# Patient Record
Sex: MALE | Race: WHITE | Employment: FULL TIME | URBAN - METROPOLITAN AREA
[De-identification: names, ages, dates, MRNs, and addresses within clinical notes are randomized per-mention and may not be internally consistent; named-entity substitution may affect disease eponyms.]

---

## 2018-09-24 ENCOUNTER — OFFICE VISIT (OUTPATIENT)
Dept: URGENT CARE | Age: 53
End: 2018-09-24

## 2018-09-24 VITALS
BODY MASS INDEX: 27.63 KG/M2 | RESPIRATION RATE: 18 BRPM | DIASTOLIC BLOOD PRESSURE: 86 MMHG | TEMPERATURE: 98.1 F | WEIGHT: 193 LBS | HEIGHT: 70 IN | OXYGEN SATURATION: 98 % | HEART RATE: 61 BPM | SYSTOLIC BLOOD PRESSURE: 143 MMHG

## 2018-09-24 DIAGNOSIS — S92.355A CLOSED NONDISPLACED FRACTURE OF FIFTH METATARSAL BONE OF LEFT FOOT, INITIAL ENCOUNTER: Primary | ICD-10-CM

## 2018-09-24 NOTE — PROGRESS NOTES
Patient is a 50 y.o. male presenting with foot injury. Foot Injury   This is a new problem. The current episode started 2 days ago (twisted at gym today ). The problem occurs constantly. Associated symptoms comments: Left foot pain and tenderness on lateral of foot . The symptoms are aggravated by walking and standing. Nothing relieves the symptoms. He has tried nothing for the symptoms. History reviewed. No pertinent past medical history. History reviewed. No pertinent surgical history. History reviewed. No pertinent family history. Social History     Social History    Marital status:      Spouse name: N/A    Number of children: N/A    Years of education: N/A     Occupational History    Not on file. Social History Main Topics    Smoking status: Former Smoker    Smokeless tobacco: Never Used    Alcohol use Not on file    Drug use: Not on file    Sexual activity: Not on file     Other Topics Concern    Not on file     Social History Narrative    No narrative on file                ALLERGIES: Penicillins    Review of Systems   Musculoskeletal: Positive for gait problem. All other systems reviewed and are negative. Vitals:    09/24/18 1717   BP: 143/86   Pulse: 61   Resp: 18   Temp: 98.1 °F (36.7 °C)   SpO2: 98%   Weight: 193 lb (87.5 kg)   Height: 5' 10\" (1.778 m)       Physical Exam   Musculoskeletal:        Left ankle: Normal.        Left foot: There is decreased range of motion, tenderness, bony tenderness and swelling (on head of 5th metatarsal bone). There is no crepitus and no deformity. Feet:    Nursing note and vitals reviewed. MDM    Procedures    ICD-10-CM ICD-9-CM    1. Injury T14.90XA 959.9 XR FOOT LT MIN 3 V       Follow with ortho     No orders of the defined types were placed in this encounter. Results for orders placed or performed in visit on 09/24/18   XR FOOT LT MIN 3 V    Narrative    EXAM:  XR FOOT LT MIN 3 V    INDICATION:   fall. Left foot pain    COMPARISON:  None. FINDINGS:  Three views of the left foot demonstrate nondisplaced fracture at the  base of the fifth metatarsal.  There is no apparent arthritis. There is  ossification of the Achilles tendon insertion. Impression    IMPRESSION:  Nondisplaced fracture base left fifth metatarsal.           The patients condition was discussed with the patient and they understand. The patient is to follow up with primary care doctor. If signs and symptoms become worse the pt is to go to the ER. The patient is to take medications as prescribed.

## 2018-09-24 NOTE — MR AVS SNAPSHOT
[address] [facility] [address] 
[phone] Patient: Hope Angelucci MRN: [id number] :1965 Visit Information Date & Time Provider Department Dept. Phone Encounter #  
 2018  4:45 PM [facility] [phone] [id number] Follow-up Instructions Return for Follow up with Orthopedic . Upcoming Health Maintenance Date Due DTaP/Tdap/Td series (1 - Tdap) 1988 Shingrix Vaccine Age 50> (1 of 2) 2017 FOBT Q 1 YEAR AGE 50-75 2017 Influenza Age 5 to Adult 2018 Allergies as of 2018  Review Complete On: 2018 By: Jessica Brenner Severity Noted Reaction Type Reactions Penicillins  2018    Rash Current Immunizations  Never Reviewed No immunizations on file. Not reviewed this visit You Were Diagnosed With   
  
 Codes Comments Closed nondisplaced fracture of fifth metatarsal bone of left foot, initial encounter    -  Primary ICD-10-CM: D16.226T ICD-9-CM: 825.25 Vitals BP Pulse Temp Resp Height(growth percentile) Weight(growth percentile) 143/86 61 98.1 °F (36.7 °C) 18 5' 10\" (1.778 m) 193 lb (87.5 kg) SpO2 BMI Smoking Status 98% 27.69 kg/m2 Former Smoker BMI and BSA Data Body Mass Index Body Surface Area  
 27.69 kg/m 2 2.08 m 2 Preferred Pharmacy Pharmacy Name Phone [address], [address] [place] [address]. [phone] Your Updated Medication List  
  
Notice  As of 2018  5:53 PM  
 You have not been prescribed any medications. Follow-up Instructions Return for Follow up with Orthopedic . To-Do List   
 2018 Imaging:  XR FOOT LT MIN 3 V Patient Instructions Fifth Metatarsal Monahan Fracture: Care Instructions Your Care Instructions A fifth metatarsal fracture is a break or a thin, hairline crack in the long bone on the outside of the foot. A Monahan fracture occurs near the end of this bone that is closest to the ankle. This type of fracture can happen when a person jumps or changes direction quickly and twists the foot or ankle the wrong way. Or it can happen from repeated stress on the bones of the foot. Treatment depends on how bad the fracture is. You may or may not have had surgery. Your doctor may have put your foot in a cast to keep it stable. A splint may be used in some cases if there is a lot of swelling. You may have been given crutches to use to keep weight off your foot. Your doctor may recommend that you keep weight off the foot for several weeks. The fracture may take 6 weeks to several months to heal. It is important to give your foot time to heal completely so that you don't hurt it again. Do not return to your usual activities until your doctor says you can. Your doctor may suggest that you get physical therapy to help regain strength and range of motion in your foot. You heal best when you take good care of yourself. Eat a variety of healthy foods, and don't smoke. Follow-up care is a key part of your treatment and safety. Be sure to make and go to all appointments, and call your doctor if you are having problems. It's also a good idea to know your test results and keep a list of the medicines you take. How can you care for yourself at home? · Be safe with medicines. Read and follow all instructions on the label. ¨ If the doctor gave you a prescription medicine for pain, take it as prescribed. ¨ If you are not taking a prescription pain medicine, ask your doctor if you can take an over-the-counter medicine. · Follow your doctor's instructions about how much weight you can put on your foot and when you can go back to your usual activities. If you were given crutches, use them as directed. · Put ice or a cold pack on your foot for 10 to 20 minutes at a time. Try to do this every 1 to 2 hours for the next 3 days (when you are awake) or until the swelling goes down. Put a thin cloth between the ice and your skin. · Prop up your foot on a pillow when you ice it or anytime you sit or lie down for the next 3 days. Try to keep it above the level of your heart. This will help reduce swelling. Cast and splint care · If your foot is in a cast or splint, follow the cast or splint care instructions your doctor gives you. If you have a removable fiberglass walking cast or a splint, do not take it off unless your doctor tells you to. · Keep your cast or splint dry. If you have a removable fiberglass walking cast or a splint, ask your doctor if it is okay to remove it to bathe. Your doctor may want you to keep it on as much as possible. · If you are told to keep your cast or splint on, tape a sheet of plastic to cover it when you bathe. Water under the cast or splint can cause your skin to itch and hurt. · Never cut your cast or stick anything down inside it to scratch an itch on your leg. When should you call for help? Call 911 anytime you think you may need emergency care. For example, call if: 
  · You have symptoms of a blood clot in your lung (called a pulmonaryembolism). These may include: 
¨ Sudden chest pain. ¨ Trouble breathing. ¨ Coughing up blood.  
  · You passed out (lost consciousness).  
 Call your doctor now or seek immediate medical care if: 
  · You have problems with your cast or splint. For example: ¨ The skin under the cast or splint is burning or stinging. ¨ The cast or splint feels too tight. ¨ There is a lot of swelling near the cast or splint. (Some swelling is normal.) ¨ You have a new fever. ¨ There is drainage or a bad smell coming from the cast or splint.  
  · You have increased or severe pain.  
  · You have tingling, weakness, or numbness in your foot and toes.   · You cannot move your toes.  
  · Your foot turns cold or changes color.  
 Watch closely for changes in your health, and be sure to contact your doctor if: 
  · The pain does not get better day by day.  
  · You do not get better as expected. Where can you learn more? Go to http://sima-refugio.info/. Enter B361 in the search box to learn more about \"Fifth Metatarsal Monahan Fracture: Care Instructions. \" Current as of: November 29, 2017 Content Version: 11.7 © 0983-9737 [employer]. Care instructions adapted under license by [employer] (which disclaims liability or warranty for this information). If you have questions about a medical condition or this instruction, always ask your healthcare professional. [address] any warranty or liability for your use of this information. Introducing [facility]! [facility] introduces MAPPING patient portal. Now you can access parts of your medical record, email your doctor's office, and request medication refills online. 1. In your internet browser, go to [email or web address] 2. Click on the First Time User? Click Here link in the Sign In box. You will see the New Member Sign Up page. 3. Enter your MAPPING Access Code exactly as it appears below. You will not need to use this code after youve completed the sign-up process. If you do not sign up before the expiration date, you must request a new code. · MAPPING Access Code: [email or web address] Expires: 12/23/2018  4:45 PM 
 
4. Enter the last four digits of your Social Security Number (xxxx) and Date of Birth (mm/dd/yyyy) as indicated and click Submit. You will be taken to the next sign-up page. 5. Create a [employer] ID. This will be your MAPPING login ID and cannot be changed, so think of one that is secure and easy to remember. 6. Create a [employer] password. You can change your password at any time. 7. Enter your Password Reset Question and Answer. This can be used at a later time if you forget your password. 8. Enter your e-mail address. You will receive e-mail notification when new information is available in [address] 9. Click Sign Up. You can now view and download portions of your medical record. 10. Click the Download Summary menu link to download a portable copy of your medical information. If you have questions, please visit the Frequently Asked Questions section of the [employer] website. Remember, [employer] is NOT to be used for urgent needs. For medical emergencies, dial 911. Now available from your iPhone and Android! Please provide this summary of care documentation to your next provider. If you have any questions after today's visit, please call [phone].

## 2018-09-24 NOTE — PATIENT INSTRUCTIONS
Fifth Metatarsal Monahan Fracture: Care Instructions  Your Care Instructions    A fifth metatarsal fracture is a break or a thin, hairline crack in the long bone on the outside of the foot. A Monahan fracture occurs near the end of this bone that is closest to the ankle. This type of fracture can happen when a person jumps or changes direction quickly and twists the foot or ankle the wrong way. Or it can happen from repeated stress on the bones of the foot. Treatment depends on how bad the fracture is. You may or may not have had surgery. Your doctor may have put your foot in a cast to keep it stable. A splint may be used in some cases if there is a lot of swelling. You may have been given crutches to use to keep weight off your foot. Your doctor may recommend that you keep weight off the foot for several weeks. The fracture may take 6 weeks to several months to heal. It is important to give your foot time to heal completely so that you don't hurt it again. Do not return to your usual activities until your doctor says you can. Your doctor may suggest that you get physical therapy to help regain strength and range of motion in your foot. You heal best when you take good care of yourself. Eat a variety of healthy foods, and don't smoke. Follow-up care is a key part of your treatment and safety. Be sure to make and go to all appointments, and call your doctor if you are having problems. It's also a good idea to know your test results and keep a list of the medicines you take. How can you care for yourself at home? · Be safe with medicines. Read and follow all instructions on the label. ¨ If the doctor gave you a prescription medicine for pain, take it as prescribed. ¨ If you are not taking a prescription pain medicine, ask your doctor if you can take an over-the-counter medicine.   · Follow your doctor's instructions about how much weight you can put on your foot and when you can go back to your usual activities. If you were given crutches, use them as directed. · Put ice or a cold pack on your foot for 10 to 20 minutes at a time. Try to do this every 1 to 2 hours for the next 3 days (when you are awake) or until the swelling goes down. Put a thin cloth between the ice and your skin. · Prop up your foot on a pillow when you ice it or anytime you sit or lie down for the next 3 days. Try to keep it above the level of your heart. This will help reduce swelling. Cast and splint care  · If your foot is in a cast or splint, follow the cast or splint care instructions your doctor gives you. If you have a removable fiberglass walking cast or a splint, do not take it off unless your doctor tells you to. · Keep your cast or splint dry. If you have a removable fiberglass walking cast or a splint, ask your doctor if it is okay to remove it to bathe. Your doctor may want you to keep it on as much as possible. · If you are told to keep your cast or splint on, tape a sheet of plastic to cover it when you bathe. Water under the cast or splint can cause your skin to itch and hurt. · Never cut your cast or stick anything down inside it to scratch an itch on your leg. When should you call for help? Call 911 anytime you think you may need emergency care. For example, call if:    · You have symptoms of a blood clot in your lung (called a pulmonaryembolism). These may include:  ¨ Sudden chest pain. ¨ Trouble breathing. ¨ Coughing up blood.     · You passed out (lost consciousness).    Call your doctor now or seek immediate medical care if:    · You have problems with your cast or splint. For example:  ¨ The skin under the cast or splint is burning or stinging. ¨ The cast or splint feels too tight. ¨ There is a lot of swelling near the cast or splint. (Some swelling is normal.)  ¨ You have a new fever.   ¨ There is drainage or a bad smell coming from the cast or splint.     · You have increased or severe pain.     · You have tingling, weakness, or numbness in your foot and toes.     · You cannot move your toes.     · Your foot turns cold or changes color.    Watch closely for changes in your health, and be sure to contact your doctor if:    · The pain does not get better day by day.     · You do not get better as expected. Where can you learn more? Go to http://sima-refugio.info/. Enter O142 in the search box to learn more about \"Fifth Metatarsal Monahan Fracture: Care Instructions. \"  Current as of: November 29, 2017  Content Version: 11.7  © 3279-1493 [employer]. Care instructions adapted under license by [employer] (which disclaims liability or warranty for this information). If you have questions about a medical condition or this instruction, always ask your healthcare professional. [address] any warranty or liability for your use of this information.

## 2019-01-28 ENCOUNTER — ED HISTORICAL/CONVERTED ENCOUNTER (OUTPATIENT)
Dept: OTHER | Age: 54
End: 2019-01-28

## 2019-02-08 ENCOUNTER — OFFICE VISIT (OUTPATIENT)
Dept: NEUROLOGY | Age: 54
End: 2019-02-08

## 2019-02-08 VITALS
SYSTOLIC BLOOD PRESSURE: 126 MMHG | DIASTOLIC BLOOD PRESSURE: 84 MMHG | HEIGHT: 70 IN | OXYGEN SATURATION: 99 % | BODY MASS INDEX: 26.4 KG/M2 | WEIGHT: 184.4 LBS | HEART RATE: 68 BPM

## 2019-02-08 DIAGNOSIS — Q21.12 PFO (PATENT FORAMEN OVALE): ICD-10-CM

## 2019-02-08 DIAGNOSIS — I63.412 CEREBROVASCULAR ACCIDENT (CVA) DUE TO EMBOLISM OF LEFT MIDDLE CEREBRAL ARTERY (HCC): Primary | ICD-10-CM

## 2019-02-08 DIAGNOSIS — E78.5 HYPERLIPIDEMIA, UNSPECIFIED HYPERLIPIDEMIA TYPE: ICD-10-CM

## 2019-02-08 RX ORDER — MELATONIN 5 MG
5 CAPSULE ORAL
COMMUNITY
End: 2019-04-04

## 2019-02-08 RX ORDER — ATORVASTATIN CALCIUM 40 MG/1
TABLET, FILM COATED ORAL DAILY
COMMUNITY
End: 2020-02-06 | Stop reason: SDUPTHER

## 2019-02-08 RX ORDER — SILDENAFIL 25 MG/1
20 TABLET, FILM COATED ORAL AS NEEDED
COMMUNITY

## 2019-02-08 RX ORDER — ASPIRIN 81 MG/1
TABLET ORAL DAILY
COMMUNITY

## 2019-02-08 RX ORDER — BISMUTH SUBSALICYLATE 262 MG
1 TABLET,CHEWABLE ORAL DAILY
COMMUNITY

## 2019-02-08 NOTE — PATIENT INSTRUCTIONS
High Cholesterol: Care Instructions Your Care Instructions Cholesterol is a type of fat in your blood. It is needed for many body functions, such as making new cells. Cholesterol is made by your body. It also comes from food you eat. High cholesterol means that you have too much of the fat in your blood. This raises your risk of a heart attack and stroke. LDL and HDL are part of your total cholesterol. LDL is the \"bad\" cholesterol. High LDL can raise your risk for heart disease, heart attack, and stroke. HDL is the \"good\" cholesterol. It helps clear bad cholesterol from the body. High HDL is linked with a lower risk of heart disease, heart attack, and stroke. Your cholesterol levels help your doctor find out your risk for having a heart attack or stroke. You and your doctor can talk about whether you need to lower your risk and what treatment is best for you. A heart-healthy lifestyle along with medicines can help lower your cholesterol and your risk. The way you choose to lower your risk will depend on how high your risk is for heart attack and stroke. It will also depend on how you feel about taking medicines. Follow-up care is a key part of your treatment and safety. Be sure to make and go to all appointments, and call your doctor if you are having problems. It's also a good idea to know your test results and keep a list of the medicines you take. How can you care for yourself at home? · Eat a variety of foods every day. Good choices include fruits, vegetables, whole grains (like oatmeal), dried beans and peas, nuts and seeds, soy products (like tofu), and fat-free or low-fat dairy products. · Replace butter, margarine, and hydrogenated or partially hydrogenated oils with olive and canola oils. (Canola oil margarine without trans fat is fine.) · Replace red meat with fish, poultry, and soy protein (like tofu). · Limit processed and packaged foods like chips, crackers, and cookies. · Bake, broil, or steam foods. Don't johnson them. · Be physically active. Get at least 30 minutes of exercise on most days of the week. Walking is a good choice. You also may want to do other activities, such as running, swimming, cycling, or playing tennis or team sports. · Stay at a healthy weight or lose weight by making the changes in eating and physical activity listed above. Losing just a small amount of weight, even 5 to 10 pounds, can reduce your risk for having a heart attack or stroke. · Do not smoke. When should you call for help? Watch closely for changes in your health, and be sure to contact your doctor if: 
  · You need help making lifestyle changes.  
  · You have questions about your medicine. Where can you learn more? Go to http://sima-refugio.info/. Enter M272 in the search box to learn more about \"High Cholesterol: Care Instructions. \" Current as of: July 22, 2018 Content Version: 11.9 © 3724-6260 [employer]. Care instructions adapted under license by [employer] (which disclaims liability or warranty for this information). If you have questions about a medical condition or this instruction, always ask your healthcare professional. [place] [address] any warranty or liability for your use of this information. Learning About How to Prevent a Stroke What is a stroke? A stroke occurs when a blood vessel in the brain bursts or is blocked by a blood clot. Without blood and the oxygen it carries, part of the brain starts to die. The part of the body controlled by the damaged area of the brain can't work properly. But there are many things you can do to help lower your stroke risk. What increases your risk for stroke? A risk factor is anything that makes you more likely to have a particular health problem. Risk factors for stroke that you can treat or change include: · Health problems like high blood pressure, atrial fibrillation, diabetes, and high cholesterol. · Smoking. · Heavy use of alcohol. · Being overweight. · Not getting enough physical activity. Risk factors you can't change include: · Age. The risk of stroke goes up as you get older. · Race.  Americans, Native Americans, and [place] Natives have a higher risk than those of other races. · Being female. Women have a higher risk of stroke than men. · Family history of stroke. Your doctor can help you know your risk. Then you and your can doctor talk about whether you need to lower it. What can you do to prevent a stroke? · Treat any health problems you have that raise your risk. · Adopt a heart-healthy lifestyle: ? Don't smoke. If you need help quitting, talk to your doctor about stop-smoking programs and medicines. These can increase your chances of quitting for good. ? Limit alcohol to 2 drinks a day for men and 1 drink a day for women. ? Stay at a healthy weight. Lose weight if you need to. 
? If your doctor recommends it, get more exercise. Walking is a good choice. Bit by bit, increase the amount you walk every day. Try for at least 30 minutes on most days of the week. ? Eat heart-healthy foods. These include fruits, vegetables, high-fiber foods, and fish. Choose foods that are low in sodium, saturated fat, and trans fat. · Decide with your doctor whether you will also take medicines to help lower your risk. For example, you and your doctor may decide you will take a medicine that prevents blood clots. What are the symptoms of a stroke? The brain damage from a stroke starts within minutes. That's why it's so important to know the symptoms of stroke and to act fast. Quick treatment can help limit damage to the brain so that you have fewer problems after the stroke. FAST is a simple way to remember the main symptoms of stroke.  Recognizing these symptoms helps you know when to call for medical help. FAST stands for: 
· Face drooping. · Arm weakness. · Speech difficulty. · Time to call 911. Follow-up care is a key part of your treatment and safety. Be sure to make and go to all appointments, and call your doctor if you are having problems. It's also a good idea to know your test results and keep a list of the medicines you take. Where can you learn more? Go to http://sima-refugio.info/. Enter G757 in the search box to learn more about \"Learning About How to Prevent a Stroke. \" Current as of: September 26, 2018 Content Version: 11.9 © 1889-8921 [employer]. Care instructions adapted under license by [employer] (which disclaims liability or warranty for this information). If you have questions about a medical condition or this instruction, always ask your healthcare professional. [place] [address] any warranty or liability for your use of this information. Learning About How to Prevent Another Stroke What can you do to prevent another stroke? After a stroke, people feel lots of different emotions. Some people are worried that they could have another stroke. Or they may feel overwhelmed by how much there is to learn and do. Some people feel sad or depressed. No matter what emotions you are feeling, you can give yourself some control and peace of mind by making a plan to lower your risk of having another stroke. Take your medicines You'll need to take medicines to help prevent another stroke. Be sure to take your medicines exactly as prescribed. And don't stop taking them unless your doctor tells you to. If you stop taking your medicines, you can increase your risk of having another stroke. Some of the medicines your doctor may prescribe include: · Aspirin or some other blood thinner to prevent blood clots. · Statins to lower cholesterol. · Blood pressure medicines to lower blood pressure. Manage other health problems You can help lower your chance of having another stroke by managing certain other health problems. Problems that increase your risk of having another stroke include: · High blood pressure. · High cholesterol. · Atrial fibrillation. · Diabetes. If you have any of these health problems, you can manage them with healthy lifestyle changes along with medicine. Adopt a healthy lifestyle · Do not smoke or allow others to smoke around you. If you need help quitting, talk to your doctor about stop-smoking programs and medicines. These can increase your chances of quitting for good. Smoking makes a stroke more likely. · Limit alcohol to 2 drinks a day for men and 1 drink a day for women. · Lose weight if you need to. Controlling your weight will help you keep your heart and body healthy. · Be active. Ask your doctor what type and level of activity is safe for you. · Eat heart-healthy foods, like fruits, vegetables, and high-fiber foods. It's also important to: · Get a flu shot every year. · Ask for help if you think you are depressed. Do stroke rehab Taking part in a stroke rehabilitation (rehab) program will help you to regain skills you lost or make the most of your abilities after a stroke. It also helps you take steps to prevent another stroke. Your rehab team will give you education and support to help you build new, healthy habits. You'll learn how to manage any other health problems that you might have. Viv Sargent also learn how to exercise safely, eat a healthy diet, and quit smoking if you smoke. Viv Sargent work with your team to decide what lifestyle choices are best for you. If your doctor hasn't already suggested it, ask him or her if stroke rehab is right for you. Know stroke symptoms Make sure you know the symptoms of stroke. FAST is a simple way to remember.  Recognizing these symptoms helps you know when to call for medical help. FAST stands for: 
· Face drooping. · Arm weakness. · Speech difficulty. · Time to call 911. Follow-up care is a key part of your treatment and safety. Be sure to make and go to all appointments, and call your doctor if you are having problems. It's also a good idea to know your test results and keep a list of the medicines you take. Where can you learn more? Go to http://sima-refugio.info/. Enter V357 in the search box to learn more about \"Learning About How to Prevent Another Stroke. \" Current as of: September 26, 2018 Content Version: 11.9 © 0004-7637 [employer]. Care instructions adapted under license by [employer] (which disclaims liability or warranty for this information). If you have questions about a medical condition or this instruction, always ask your healthcare professional. [address] any warranty or liability for your use of this information.

## 2019-02-08 NOTE — PROGRESS NOTES
NEUROLOGY CLINIC NOTE Patient ID: 
Anival Ornelas 
[id number] 
48 y.o. 
1965 Date of Consultation:  February 8, 2019 Reason for Consultation:  Stroke Chief Complaint Patient presents with  New Patient  
  had a stroke History of Present Illness: There are no active problems to display for this patient. Past Medical History:  
Diagnosis Date  Cerebral artery occlusion with cerebral infarction ([facility] [address] History reviewed. No pertinent surgical history. Prior to Admission medications Medication Sig Start Date End Date Taking? Authorizing Provider  
aspirin delayed-release 81 mg tablet Take  by mouth daily. Yes Provider, Historical  
atorvastatin (LIPITOR) 40 mg tablet Take  by mouth daily. Yes Provider, Historical  
doxylamine succinate (UNISOM) 25 mg tablet Take 25 mg by mouth nightly as needed for Sleep. Yes Provider, Historical  
melatonin 5 mg cap capsule Take 5 mg by mouth nightly. Yes Provider, Historical  
multivitamin (ONE A DAY) tablet Take 1 Tab by mouth daily. Yes Provider, Historical  
glucosam-chond-hyalu-CF borate ([facility]) 750 mg-100 mg- 1.65 mg-108 mg tab Take  by mouth. Yes Provider, Historical  
sildenafil citrate (VIAGRA) 25 mg tablet Take 25 mg by mouth as needed. Yes Provider, Historical  
 
Allergies Allergen Reactions  Penicillins Rash Social History Tobacco Use  Smoking status: Former Smoker  Smokeless tobacco: Never Used Substance Use Topics  Alcohol use: Yes Alcohol/week: 3.6 oz Types: 3 Glasses of wine, 3 Cans of beer per week Family History Problem Relation Age of Onset  Stroke Mother  Heart Disease Mother Subjective:  
  
Anival Ornelas is a 48 y.o. RHWM with history of recent CVA, PFO, hyperlipidemia and ED who is here for further evaluation and management after a recent stroke. Per patient and records condition started 1/28/2019.   Patient was in a meeting at around 8 10 in the morning and was giving a talk. Suddenly developed problems speaking and noted weakness of the right arm and leg. Patient was brought to [facility] and diagnosed to have a left MCA stroke with left MCA occlusion. Initial NIH stroke scale was 12. Patient received IV TPA. Patient was then transferred to [facility] for possible mechanical thrombectomy. Upon arriving at [facility] his right hemiparesis improved significantly. Only residual was his aphasia. Repeat NIH stroke scale was 2. Neuro exam mainly revealed issues with aphasia. CTA of the head and neck and CT perfusion revealed no evidence of vessel occlusion. Internal and external carotid arteries are patent. No evidence of aneurysm. CT perfusion revealed findings consistent with large penumbra within the left MCA distribution with small area of core infarction. Transthoracic echocardiogram revealed EF of 55-60%. With Valsalva there are small amounts of bubbles noted in the left atrium immediately after injection. This may be consistent with a small PFO. Neurosurgery saw the patient and no intervention was necessary given the improvement. Patient was seen by physical medicine and rehab specialist and recommended outpatient speech therapy. Speech therapy note reviewed and noted very subtle deficits and patient is able to use compensatory strategies to assist with verbal output. No speech therapy services warranted upon discharge. Repeat head CT 24 hours post TPA revealed evolving posterior temporal lobe infarction with punctate hemorrhage. Old left posterolateral cerebellar infarction. Brain MRI was done 1/29/2019 and revealed small posterior left temporal lobe acute infarction and punctate focus of tiny amount of petechial hemorrhage. No mass-effect. Small focus of encephalomalacia in the superior aspect of the left cerebellar hemisphere posterolaterally.   Lower extremity Doppler studies did not reveal any DVT. LDL was 116 and patient was started on atorvastatin 40 mg daily. Patient also started on aspirin 81 mg daily for stroke prophylaxis. Hypercoagulable workup consisting of ESR, JOSEE, syphilis, HIV, homocysteine levels were done and were unremarkable. Patient was discharged 1/29/2019. Since then, patient reports he is doing fine. He is compliant with his aspirin 81 mg daily and atorvastatin 40 mg daily. He received a event monitor from [facility] and was unclear as to how to proceed with it. No issues with his speech or thought process. No residual right arm and leg weakness. Outside reports reviewed: radiology reports, lab reports, historical medical records. Review of Systems: A comprehensive review of systems was performed:  
Constitutional: positive for none Eyes: positive for none Ears, nose, mouth, throat, and face: positive for none Respiratory: positive for none Cardiovascular: positive for none Gastrointestinal: positive for none Genitourinary: positive for none Integument/breast: positive for none Hematologic/lymphatic: positive for none Musculoskeletal: positive for none Neurological: positive for none Behvioral/Psych: positive for none Endocrine: positive for none Allergic/Immunologic: positive for none Objective:  
 
Visit Vitals /84 Pulse 68 Ht 5' 10\" (1.778 m) Wt 184 lb 6.4 oz (83.6 kg) SpO2 99% BMI 26.46 kg/m² PHYSICAL EXAM: 
 
General Appearance: Alert, patient appears stated age. General:  Well developed, well nourished patent in no apparent distress. Head/Face: The head is normocephalic and atraumatic. Eyes: Conjunctivae appear normal. Sclera appear normal and non-icteric. Nose (and Sinus):   No abnormality of the nose or sinuses is noted. Oral:   Throat is clear. Lymphatics:  No lymphadenopathy in the neck/head. Neck and Thyroid:   No bruits noted in the neck. Respiratory:  Lungs clear to auscultation. Cardiovascular:  Palpation and auscultation: regular rate and rhythm. Extremity: No joint swelling, erythema or pedal edema. NEUROLOGICAL EXAM: 
 
Appearance: The patient is well developed, well nourished, provides a coherent history and is in no acute distress. Mental Status: Oriented to time, place and person. Fluent, no aphasia or dysarthria. Mood and affect appropriate. MMSE 30/30 Cranial Nerves:   Intact visual fields. VA 20/25 OD and 20/30 OS without correction on near vision. Fundi are benign. ROSY, EOM's full, no nystagmus, no ptosis. Facial sensation is normal. Corneal reflexes are intact. Facial movement is symmetric. Hearing is normal bilaterally. Palate is midline with normal elevation. Sternocleidomastoid and trapezius muscles are normal. Tongue is midline. Motor:  5/5 strength in upper and lower proximal and distal muscles. Normal bulk and tone. No pronator drift. Reflexes:   Deep tendon reflexes 2+/4 and symmetrical. Downgoing toes. Sensory:   Normal to touch, pinprick and vibration. Gait:  Normal gait. No Romberg. Can do tandem walking. Tremor:   No tremor noted. Cerebellar:  Intact FTN/GISSELLE/HTS. Neurovascular:  Normal heart sounds and regular rhythm, peripheral pulses intact, and no carotid bruits. Imaging CT Head, CTA head/neck, brain MRI: reviewed Labs Reviewed Assessment:  
CVA, embolic left MCA Hyperlipidemia PFO Plan:  
Neurological examination is nonfocal.  No residual cognitive or right-sided weakness. Cognitive testing done today and patient scored 30/30. Extensive medical records from [facility] was reviewed as summarized above. Brain MRI did reveal small left posterior temporal lobe infarct with punctate hemorrhage. Repeat head CT was ordered to assess resolution of the hemorrhage. Review of records revealed inadequate hypercoagulable workup.   Check factor V Leiden, factor VIII, fibrinogen antibody, homocysteine, lupus anticoagulant antibody, plasminogen activity inhibitor 1, protein C deficiency and protein S panel. Patient was advised not to fly for 30 days. Also advised not to take medications for his ED for the next 30 days. Continue aspirin 81 mg daily for stroke prophylaxis. Advised also no strenuous physical activity for the next 30 days. Advised to call 911 if new deficits occur. Patient was advised per DMV regulation he cannot drive for at least 3 months pending reevaluation. Advised to see his ophthalmologist and obtain a [place] visual field testing. EEG was ordered to assess for subclinical seizures given the cortical stroke. Laboratory workup at [facility] revealed elevated LDL. Should be less than 70. Continue atorvastatin 40 mg daily. Patient was advised strict compliance with dietary modifications and medications for hyperlipidemia. Echocardiogram done today revealed PFO. Given his recent stroke, this is symptomatic and may need to be closed. Patient referred to cardiology for further evaluation and treatment. All questions and concerns were answered. Visit lasted 80 minutes. Greater than 50% was spent reviewing his medical records as summarized above, discussion about his condition, etiology, prognosis, need to reassess resolution of the hemorrhage with a repeat head CT, need to obtain EEG to rule out subclinical seizure given cortical stroke, need to finish hypercoagulable workup and laboratory ordered, discussion about stroke prevention and prophylaxis, DMV regulation regarding driving, advised not to fly 30 days after the stroke, referral to cardiology in relation to his PFO and possible closure, treatment options, medication

## 2019-02-11 ENCOUNTER — DOCUMENTATION ONLY (OUTPATIENT)
Dept: NEUROLOGY | Age: 54
End: 2019-02-11

## 2019-02-14 ENCOUNTER — TELEPHONE (OUTPATIENT)
Dept: NEUROLOGY | Age: 54
End: 2019-02-14

## 2019-02-15 DIAGNOSIS — I63.412 CEREBROVASCULAR ACCIDENT (CVA) DUE TO EMBOLISM OF LEFT MIDDLE CEREBRAL ARTERY (HCC): ICD-10-CM

## 2019-02-20 ENCOUNTER — HOSPITAL ENCOUNTER (OUTPATIENT)
Dept: CT IMAGING | Age: 54
Discharge: HOME | End: 2019-02-20
Attending: PSYCHIATRY & NEUROLOGY
Payer: COMMERCIAL

## 2019-02-20 ENCOUNTER — HOSPITAL ENCOUNTER (OUTPATIENT)
Dept: NEUROLOGY | Age: 54
Discharge: HOME | End: 2019-02-20
Attending: PSYCHIATRY & NEUROLOGY
Payer: COMMERCIAL

## 2019-02-20 DIAGNOSIS — I63.412 CEREBROVASCULAR ACCIDENT (CVA) DUE TO EMBOLISM OF LEFT MIDDLE CEREBRAL ARTERY (HCC): ICD-10-CM

## 2019-02-20 PROCEDURE — 70450 CT HEAD/BRAIN W/O DYE: CPT

## 2019-02-20 PROCEDURE — 95816 EEG AWAKE AND DROWSY: CPT

## 2019-02-20 NOTE — TELEPHONE ENCOUNTER
Spoke with insurance company. Got authorization number. Called Patient care team gave it to them. Jos Monk told him that he was good to go for his appointment today at 10:30.

## 2019-02-21 ENCOUNTER — OFFICE VISIT (OUTPATIENT)
Dept: CARDIOLOGY CLINIC | Age: 54
End: 2019-02-21

## 2019-02-21 VITALS
WEIGHT: 190.9 LBS | DIASTOLIC BLOOD PRESSURE: 78 MMHG | OXYGEN SATURATION: 98 % | BODY MASS INDEX: 27.33 KG/M2 | HEART RATE: 65 BPM | RESPIRATION RATE: 16 BRPM | HEIGHT: 70 IN | SYSTOLIC BLOOD PRESSURE: 120 MMHG

## 2019-02-21 DIAGNOSIS — Q21.12 PFO (PATENT FORAMEN OVALE): Primary | ICD-10-CM

## 2019-02-21 NOTE — PROGRESS NOTES
Subjective/HPI:     Mr. Oscar Moreno is a 48 y.o. male is here to new patient consultation. He has no significant PMHx. He was recently admitted to [facility] with left MCA stroke on 1/28/19, requiring tPA. He had echocardiogram done which showed a small PFO, with preserved LVEF and no significant valvular disease. He was discharged from the hospital in stable condition. He has not had any significant neuro deficits. 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 Prior to his stroke, he was very active. He exercises twice a day involving cardio and weights. He recently started [employer]. He has never smoked. He uses alcohol occasionally. He has never had any history of congenital heart disease, coronary artery disease, heart failure or arrhythmias. His father has hypertension. His mother has had heart attacks without stenting or bypass, first diagnosed in her 62s, valvular disease, high cholesterol and hypertension. His brother had bypass surgery in his early 46s. He was referred by his neurologist for consideration of closure of his PFO noted on echocardiogram.    Echo (1/29/19):  Concentric left ventricular remodeling  Left ventricular systolic function is normal.  LV ejection fraction = 55-60%  There is trace aortic regurgitation  There is mild mitral regurgitation  Contrast injected  With valsalva there are a small amount of bubbles noted in the LA immediately after injection. This may be consistent with a small PFO. PCP Provider  Juaquin Valentino MD  Past Medical History:   Diagnosis Date    Cerebral artery occlusion with cerebral infarction [facility])       History reviewed. No pertinent surgical history.   Family History   Problem Relation Age of Onset    Stroke Mother     Heart Disease Mother      Social History     Socioeconomic History    Marital status:      Spouse name: Not on file    Number of children: Not on file    Years of education: Not on file    Highest education level: Not on file   Social Needs    Financial resource strain: Not on file    Food insecurity - worry: Not on file    Food insecurity - inability: Not on file    Transportation needs - medical: Not on file   [employer] needs - non-medical: Not on file   Occupational History    Not on file   Tobacco Use    Smoking status: Never Smoker    Smokeless tobacco: Never Used   Substance and Sexual Activity    Alcohol use: Yes     Alcohol/week: 3.6 oz     Types: 3 Glasses of wine, 3 Cans of beer per week     Comment: per week    Drug use: No    Sexual activity: Not on file   Other Topics Concern    Not on file   Social History Narrative    Not on file       Allergies   Allergen Reactions    Penicillins Rash        Current Outpatient Medications   Medication Sig    aspirin delayed-release 81 mg tablet Take  by mouth daily.  atorvastatin (LIPITOR) 40 mg tablet Take  by mouth daily.  doxylamine succinate (UNISOM) 25 mg tablet Take 25 mg by mouth nightly as needed for Sleep.  melatonin 5 mg cap capsule Take 5 mg by mouth nightly.  multivitamin (ONE A DAY) tablet Take 1 Tab by mouth daily.  glucosam-chond-hyalu-CF borate ([facility]) 750 mg-100 mg- 1.65 mg-108 mg tab Take  by mouth.  sildenafil citrate (VIAGRA) 25 mg tablet Take 20 mg by mouth as needed. No current facility-administered medications for this visit. I have reviewed the problem list, allergy list, medical history, family, social history and medications. Review of Symptoms:    Review of Systems   Constitutional: Negative for chills, fever and weight loss. HENT: Negative for nosebleeds. Eyes: Negative for blurred vision and double vision. Respiratory: Negative for cough, shortness of breath and wheezing. Cardiovascular: Negative for chest pain, palpitations, orthopnea, leg swelling and PND. Gastrointestinal: Negative for abdominal pain, blood in stool, diarrhea, nausea and vomiting. Musculoskeletal: Negative for joint pain. Skin: Negative for rash. Neurological: Negative for dizziness, tingling and loss of consciousness. Endo/Heme/Allergies: Does not bruise/bleed easily. Physical Exam:      General: Well developed, in no acute distress, cooperative and alert  HEENT: No carotid bruits, no JVD, trach is midline. Neck Supple, PEERL, EOM intact. Heart:  reg rate and rhythm; normal S1/S2; no murmurs, gallops or rubs.   Respiratory: Clear bilaterally x 4, no wheezing or rales  Abdomen:   Soft, non-tender, no distention, no masses. + BS.   Extremities:  Normal cap refill, no cyanosis, atraumatic. No edema. Neuro: A&Ox3, speech clear, gait stable. Skin: Skin color is normal. No rashes or lesions. Non diaphoretic  Vascular: 2+ pulses symmetric in all extremities    Vitals:    02/21/19 1351 02/21/19 1408   BP: 120/78 120/78   Pulse: 65    Resp: 16    SpO2: 98%    Weight: 190 lb 14.4 oz (86.6 kg)    Height: 5' 10\" (1.778 m)        Cardiographics    ECG: sinus bradycardia  No results found for this or any previous visit. Cardiology Labs:  No results found for: CHOL, CHOLX, CHLST, [address], [address], HDL, LDL, LDLC, DLDLP, TGLX, TRIGL, TRIGP, CHHD, CHHDX    No results found for: NA, K, CL, CO2, AGAP, GLU, BUN, CREA, BUCR, GFRAA, GFRNA, CA, TBIL, TBILI, GPT, SGOT, AP, TP, ALB, GLOB, AGRAT, ALT        Assessment:     Assessment:       ICD-10-CM ICD-9-CM    1. PFO (patent foramen ovale) Q21.1 745.5 AMB POC EKG ROUTINE W/ 12 LEADS, INTER & REP      ECHO VENITA W OR WO CONTRAST      LOOP MONITOR        Plan:     1. PFO (patent foramen ovale)  Echocardiogram at [address] [place] [address] showed small PFO with preserved LVEF. Will arrange for VENITA for further assessment of PFO and sizing. If moderate PFO noted, then will discuss closure of PFO given CVA. Will also obtain 30-day event monitor to r/o arrhythmias.   - AMB POC EKG ROUTINE W/ 12 LEADS, INTER & REP      DIMPLE Clark-RAMESH Muir NP [facility]    2/22/2019         Patient seen, examined by me personally. Plan discussed as detailed. Agree with note as outlined by  NP. I confirm findings in history and physical exam. No additional findings noted. Agree with plan as outlined above. Echo report from [facility] reviewed and discussed with patient. Will schedule VENITA to further evaluate.     Babak Gr MD

## 2019-02-21 NOTE — PROGRESS NOTES
1. Have you been to the ER, urgent care clinic since your last visit? Hospitalized since your last visit? [place] [facility] 2. Have you seen or consulted any other health care providers outside of the [address] [place] [address] since your last visit? Include any pap smears or colon screening. Seen at [facility] for his stroke and treated. He did have [place] [facility]         Chief Complaint   Patient presents with    New Patient     referred by Neurology for CVA and PFO- pt denies any cardiac symptoms

## 2019-02-22 LAB
DILUTE PROTHROMBIN TIME(DPT), 005201: 40.1 SEC (ref 0–55)
DPT CONFIRM RATIO, 005203: 0.81 RATIO (ref 0–1.4)
INTERPRETATION, 117893: NORMAL
PROT C ACT/NOR PPP: 112 % (ref 73–180)
PROT C AG ACT/NOR PPP IA: 108 % (ref 60–150)
SCREEN APTT: 34.2 SEC (ref 0–51.9)
SCREEN DRVVT: 28.9 SEC (ref 0–47)
THROMBIN TIME: 17.2 SEC (ref 0–23)

## 2019-02-22 NOTE — PROCEDURES
[facility] [place], [address]      Electroencephalogram         Procedure Date: 02/20/2019    Procedure ID: MR     Procedure Type: Routine    Patient Name: Love Leyva     YOB: 1965    Medical Record No: [id number]    INDICATION: stroke    Medications:  Current Outpatient Medications   Medication Sig    aspirin delayed-release 81 mg tablet Take  by mouth daily.  atorvastatin (LIPITOR) 40 mg tablet Take  by mouth daily.  doxylamine succinate (UNISOM) 25 mg tablet Take 25 mg by mouth nightly as needed for Sleep.  melatonin 5 mg cap capsule Take 5 mg by mouth nightly.  multivitamin (ONE A DAY) tablet Take 1 Tab by mouth daily.  glucosam-chond-hyalu-CF borate ([facility] [employer]) 750 mg-100 mg- 1.65 mg-108 mg tab Take  by mouth.  sildenafil citrate (VIAGRA) 25 mg tablet Take 20 mg by mouth as needed. No current facility-administered medications for this encounter. DESCRIPTION OF PROCEDURE: Electrodes were applied in accordance with the international 10-20 system of electrode placement. EEG was reviewed in both bipolar and referential montages    Description of Activity:  During wakefulness, there is continuous runs of 10-11 Hz symmetric posterior alpha rhythm, that attenuate symmetrically with eye opening. Low voltage beta activity occurs symmetrically at the anterior head regions bilaterally. During drowsiness, there is attenuation of the alpha rhythm and low voltage theta activity occurs bilaterally. During sleep, sleep spindles occur and are symmetric. Intermittent photic stimulation was performed and induces bilaterally symmetric posterior driving responses. No sharp or spike discharges, seizures or epileptiform discharges seen. No focal asymmetry. Clinical Interpretation:   This EEG, performed during wakefulness and sleep is normal. There is no focal asymmetry, seizures or epileptiform discharges seen.

## 2019-02-26 LAB
F5 GENE MUT ANL BLD/T: NORMAL
FACT VIII AG ACT/NOR PPP IA: 154 %
FIBRINOGEN AG PPP NEPH-MCNC: 247 MG/DL (ref 180–350)
HCYS SERPL-SCNC: 7.2 UMOL/L (ref 0–15)
PAI1 AG PPP IA-ACNC: <4 IU/ML (ref 0–27)
PROT S ACT/NOR PPP: 72 % (ref 63–140)
PROT S AG ACT/NOR PPP IA: 82 % (ref 60–150)
PROT S FREE AG ACT/NOR PPP IA: 84 % (ref 57–157)

## 2019-02-27 ENCOUNTER — OFFICE VISIT (OUTPATIENT)
Dept: NEUROLOGY | Age: 54
End: 2019-02-27

## 2019-02-27 VITALS
SYSTOLIC BLOOD PRESSURE: 124 MMHG | WEIGHT: 196 LBS | BODY MASS INDEX: 28.06 KG/M2 | DIASTOLIC BLOOD PRESSURE: 86 MMHG | HEART RATE: 98 BPM | HEIGHT: 70 IN | OXYGEN SATURATION: 58 %

## 2019-02-27 DIAGNOSIS — Q21.12 PFO (PATENT FORAMEN OVALE): ICD-10-CM

## 2019-02-27 DIAGNOSIS — I63.412 CEREBROVASCULAR ACCIDENT (CVA) DUE TO EMBOLISM OF LEFT MIDDLE CEREBRAL ARTERY (HCC): Primary | ICD-10-CM

## 2019-02-27 DIAGNOSIS — E78.5 HYPERLIPIDEMIA, UNSPECIFIED HYPERLIPIDEMIA TYPE: ICD-10-CM

## 2019-02-27 DIAGNOSIS — M79.2 NEURALGIA: ICD-10-CM

## 2019-02-27 RX ORDER — AMITRIPTYLINE HYDROCHLORIDE 10 MG/1
30 TABLET, FILM COATED ORAL
Qty: 90 TAB | Refills: 1 | Status: SHIPPED | OUTPATIENT
Start: 2019-02-27 | End: 2019-04-04 | Stop reason: SDUPTHER

## 2019-02-27 NOTE — PROGRESS NOTES
NEUROLOGY CLINIC NOTE Patient ID: 
Sherie Laughlin 
[id number] 
48 y.o. 
1965 Date of Consultation:  February 27, 2019 Reason for Consultation:  Stroke, facial pain, left arm numbness Chief Complaint Patient presents with  Follow-up Pain in jaw and ear left side. Feels entire feels heavier than the right. Numbess and tingling in left leg adnd left arm. History of Present Illness:  
 
Patient Active Problem List  
 Diagnosis Date Noted  PFO (patent foramen ovale) 02/21/2019 Past Medical History:  
Diagnosis Date  Cerebral artery occlusion with cerebral infarction ([facility] [address] No past surgical history on file. Prior to Admission medications Medication Sig Start Date End Date Taking? Authorizing Provider  
aspirin delayed-release 81 mg tablet Take  by mouth daily. Yes Provider, Historical  
atorvastatin (LIPITOR) 40 mg tablet Take  by mouth daily. Yes Provider, Historical  
doxylamine succinate (UNISOM) 25 mg tablet Take 25 mg by mouth nightly as needed for Sleep. Yes Provider, Historical  
melatonin 5 mg cap capsule Take 5 mg by mouth nightly. Yes Provider, Historical  
multivitamin (ONE A DAY) tablet Take 1 Tab by mouth daily. Yes Provider, Historical  
glucosam-chond-hyalu-CF borate ([facility]cho) 750 mg-100 mg- 1.65 mg-108 mg tab Take  by mouth. Yes Provider, Historical  
sildenafil citrate (VIAGRA) 25 mg tablet Take 20 mg by mouth as needed. Yes Provider, Historical  
 
Allergies Allergen Reactions  Penicillins Rash Social History Tobacco Use  Smoking status: Never Smoker  Smokeless tobacco: Never Used Substance Use Topics  Alcohol use: Yes Alcohol/week: 3.6 oz Types: 3 Glasses of wine, 3 Cans of beer per week Comment: per week Family History Problem Relation Age of Onset  Stroke Mother  Heart Disease Mother Subjective: Sultana Sotelo is a 48 y.o. RHWM with history of recent CVA, PFO, hyperlipidemia and ED who is here for further evaluation and management after a recent stroke. Per patient and records condition started 1/28/2019. Patient was in a meeting at around 8 10 in the morning and was giving a talk. Suddenly developed problems speaking and noted weakness of the right arm and leg. Patient was brought to [facility] and diagnosed to have a left MCA stroke with left MCA occlusion. Initial NIH stroke scale was 12. Patient received IV TPA. Patient was then transferred to [facility] for possible mechanical thrombectomy. Upon arriving at [facility] his right hemiparesis improved significantly. Only residual was his aphasia. Repeat NIH stroke scale was 2. Neuro exam mainly revealed issues with aphasia. CTA of the head and neck and CT perfusion revealed no evidence of vessel occlusion. Internal and external carotid arteries are patent. No evidence of aneurysm. CT perfusion revealed findings consistent with large penumbra within the left MCA distribution with small area of core infarction. Transthoracic echocardiogram revealed EF of 55-60%. With Valsalva there are small amounts of bubbles noted in the left atrium immediately after injection. This may be consistent with a small PFO. Neurosurgery saw the patient and no intervention was necessary given the improvement. Patient was seen by physical medicine and rehab specialist and recommended outpatient speech therapy. Speech therapy note reviewed and noted very subtle deficits and patient is able to use compensatory strategies to assist with verbal output. No speech therapy services warranted upon discharge. Repeat head CT 24 hours post TPA revealed evolving posterior temporal lobe infarction with punctate hemorrhage. Old left posterolateral cerebellar infarction.   Brain MRI was done 1/29/2019 and revealed small posterior left temporal lobe acute infarction and punctate focus of tiny amount of petechial hemorrhage. No mass-effect. Small focus of encephalomalacia in the superior aspect of the left cerebellar hemisphere posterolaterally. Lower extremity Doppler studies did not reveal any DVT. LDL was 116 and patient was started on atorvastatin 40 mg daily. Patient also started on aspirin 81 mg daily for stroke prophylaxis. Hypercoagulable workup consisting of ESR, JOSEE, syphilis, HIV, homocysteine levels were done and were unremarkable. Patient was discharged 1/29/2019. Since then, patient reports he is doing fine. He is compliant with his aspirin 81 mg daily and atorvastatin 40 mg daily. He received a event monitor from [facility] and was unclear as to how to proceed with it. No issues with his speech or thought process. No residual right arm and leg weakness. Since the last visit on 2/8/2019, hypercoagulable workup was unremarkable (lupus anticoagulant, protein C deficiency, factor V leiden, homocysteine, plasminogen, fibrinogen antigen, protein S panel and factor VIII antigen). Head CT without contrast done 2/20/2019 revealed small focal area of encephalomalacia left posterior superior lateral temporal lobe corresponding to infarct. No acute intracranial abnormality demonstrated. No hemorrhage seen. EEG done 2/22/2019 was normal awake and sleep patterns. Per patient for the past several days he has been having intermittent and worse at night abrupt onset of throbbing pain over and inside the left ear radiating towards the jaw area and behind the left eye. Moderate to severe pain. No associated signs and symptoms. Lasts for several minutes. Use of orajel helps. He also mentions two nights PTC of episode of his left arm feeling numb and heavy that lasted several minutes and spontaneously resolved.  He has been having daily heartburn and fatigue since discharge from the hospital. Patient has been seen by cardiology and will be having a VENITA done to assess for a PFO and 30 day event monitoring to assess for arrhythmias. Outside reports reviewed: radiology reports, lab reports, office notes Review of Systems: A comprehensive review of systems was performed:  
Constitutional: positive for none Eyes: positive for eye pain Ears, nose, mouth, throat, and face: positive for ear pain Respiratory: positive for none Cardiovascular: positive for none Gastrointestinal: positive for heartburn Genitourinary: positive for none Integument/breast: positive for none Hematologic/lymphatic: positive for none Musculoskeletal: positive for none Neurological: positive for numbness Behvioral/Psych: positive for none Endocrine: positive for none Allergic/Immunologic: positive for none Objective:  
 
Visit Vitals /86 Pulse 98 Ht 5' 10\" (1.778 m) Wt 196 lb (88.9 kg) SpO2 (!) 58% BMI 28.12 kg/m² 4/10 left ear pain PHYSICAL EXAM: 
 
NEUROLOGICAL EXAM: 
 
Appearance: The patient is well developed, well nourished, provides a coherent history and is in no acute distress. Mental Status: Oriented to time, place and person. Fluent, no aphasia or dysarthria. Mood and affect appropriate. Cranial Nerves:   II - XII were intact. Motor:  5/5 strength in upper and lower proximal and distal muscles. Normal bulk and tone. No pronator drift. Reflexes:   Deep tendon reflexes 2+/4 and symmetrical. Downgoing toes. Sensory:   Normal to cold and vibration. Gait:  Normal gait. No Romberg. Can do tandem walking. Tremor:   No tremor noted. Cerebellar:  Intact FTN/GISSELLE/HTS. Neurovascular:  Normal heart sounds and regular rhythm, peripheral pulses intact, and no carotid bruits. (+) tenderness on palpation left occiput and pre-auricular area. Imaging CT Head Labs Reviewed Assessment:  
CVA, embolic left MCA Neuralgia Hyperlipidemia PFO 
 
 Plan:  
Neurological examination is unchanged. It is still nonfocal.  No residual cognitive or right-sided weakness. Previous cognitive testing score was 30/30. Brain MRI did reveal small left posterior temporal lobe infarct with punctate hemorrhage. Repeat head CT without contrast did not reveal any hemorrhage. Left small posterior temporal lobe encephalomalacia. Negative hypercoagulable workup. Patient was advised likely the symptoms of left arm numbness and heaviness may have represented a TIA. Advised next time it happens to call 911 for immediate evaluation. He understood. Given issues with heartburn, patient was advised to change his aspirin to the enteric-coated once but still take 81 mg daily for now for stroke prophylaxis. Again advised to avoid strenuous physical activity until cardiac workup is done. Patient was advised per DMV regulation he cannot drive for at least 3 months pending reevaluation. Advised to see his ophthalmologist and obtain a [place] visual field testing. EEG was normal awake and sleep patterns. Exam reveals elements of occipital neuralgia and possible left trigeminal neuralgia. Trial of amitriptyline 10 mg at bedtime initially and up to 30 mg at bedtime if necessary. Prescription was provided. Okay to take over-the-counter medications for abortive therapy for breakthrough pain. Laboratory workup at [facility] revealed elevated LDL. Should be less than 70. Continue atorvastatin 40 mg daily. Patient was advised strict compliance with dietary modifications and medications for hyperlipidemia. Echocardiogram done at [facility] revealed PFO. He has been seen by cardiology and is undergoing a VENITA and 30 day event monitoring. If PFO is confirmed and event monitoring is negative, I would recommend PFO closure from a neurological standpoint. All questions and concerns were answered.

## 2019-02-27 NOTE — PATIENT INSTRUCTIONS
Learning About How to Prevent a Stroke What is a stroke? A stroke occurs when a blood vessel in the brain bursts or is blocked by a blood clot. Without blood and the oxygen it carries, part of the brain starts to die. The part of the body controlled by the damaged area of the brain can't work properly. But there are many things you can do to help lower your stroke risk. What increases your risk for stroke? A risk factor is anything that makes you more likely to have a particular health problem. Risk factors for stroke that you can treat or change include: 
· Health problems like high blood pressure, atrial fibrillation, diabetes, and high cholesterol. · Smoking. · Heavy use of alcohol. · Being overweight. · Not getting enough physical activity. Risk factors you can't change include: · Age. The risk of stroke goes up as you get older. · Race.  Americans, Native Americans, and [place] Natives have a higher risk than those of other races. · Being female. Women have a higher risk of stroke than men. · Family history of stroke. Your doctor can help you know your risk. Then you and your can doctor talk about whether you need to lower it. What can you do to prevent a stroke? · Treat any health problems you have that raise your risk. · Adopt a heart-healthy lifestyle: ? Don't smoke. If you need help quitting, talk to your doctor about stop-smoking programs and medicines. These can increase your chances of quitting for good. ? Limit alcohol to 2 drinks a day for men and 1 drink a day for women. ? Stay at a healthy weight. Lose weight if you need to. 
? If your doctor recommends it, get more exercise. Walking is a good choice. Bit by bit, increase the amount you walk every day. Try for at least 30 minutes on most days of the week. ? Eat heart-healthy foods. These include fruits, vegetables, high-fiber foods, and fish. Choose foods that are low in sodium, saturated fat, and trans fat. · Decide with your doctor whether you will also take medicines to help lower your risk. For example, you and your doctor may decide you will take a medicine that prevents blood clots. What are the symptoms of a stroke? The brain damage from a stroke starts within minutes. That's why it's so important to know the symptoms of stroke and to act fast. Quick treatment can help limit damage to the brain so that you have fewer problems after the stroke. FAST is a simple way to remember the main symptoms of stroke. Recognizing these symptoms helps you know when to call for medical help. FAST stands for: 
· Face drooping. · Arm weakness. · Speech difficulty. · Time to call 911. Follow-up care is a key part of your treatment and safety. Be sure to make and go to all appointments, and call your doctor if you are having problems. It's also a good idea to know your test results and keep a list of the medicines you take. Where can you learn more? Go to http://sima-refugio.info/. Enter G757 in the search box to learn more about \"Learning About How to Prevent a Stroke. \" Current as of: September 26, 2018 Content Version: 11.9 © 5709-5315 [employer]. Care instructions adapted under license by [employer] (which disclaims liability or warranty for this information). If you have questions about a medical condition or this instruction, always ask your healthcare professional. [place] [address] any warranty or liability for your use of this information. Learning About How to Prevent Another Stroke What can you do to prevent another stroke? After a stroke, people feel lots of different emotions. Some people are worried that they could have another stroke. Or they may feel overwhelmed by how much there is to learn and do. Some people feel sad or depressed.  
No matter what emotions you are feeling, you can give yourself some control and peace of mind by making a plan to lower your risk of having another stroke. Take your medicines You'll need to take medicines to help prevent another stroke. Be sure to take your medicines exactly as prescribed. And don't stop taking them unless your doctor tells you to. If you stop taking your medicines, you can increase your risk of having another stroke. Some of the medicines your doctor may prescribe include: · Aspirin or some other blood thinner to prevent blood clots. · Statins to lower cholesterol. · Blood pressure medicines to lower blood pressure. Manage other health problems You can help lower your chance of having another stroke by managing certain other health problems. Problems that increase your risk of having another stroke include: · High blood pressure. · High cholesterol. · Atrial fibrillation. · Diabetes. If you have any of these health problems, you can manage them with healthy lifestyle changes along with medicine. Adopt a healthy lifestyle · Do not smoke or allow others to smoke around you. If you need help quitting, talk to your doctor about stop-smoking programs and medicines. These can increase your chances of quitting for good. Smoking makes a stroke more likely. · Limit alcohol to 2 drinks a day for men and 1 drink a day for women. · Lose weight if you need to. Controlling your weight will help you keep your heart and body healthy. · Be active. Ask your doctor what type and level of activity is safe for you. · Eat heart-healthy foods, like fruits, vegetables, and high-fiber foods. It's also important to: · Get a flu shot every year. · Ask for help if you think you are depressed. Do stroke rehab Taking part in a stroke rehabilitation (rehab) program will help you to regain skills you lost or make the most of your abilities after a stroke. It also helps you take steps to prevent another stroke. Your rehab team will give you education and support to help you build new, healthy habits. You'll learn how to manage any other health problems that you might have. Sarahi Anthony also learn how to exercise safely, eat a healthy diet, and quit smoking if you smoke. Sarahi Lynchon work with your team to decide what lifestyle choices are best for you. If your doctor hasn't already suggested it, ask him or her if stroke rehab is right for you. Know stroke symptoms Make sure you know the symptoms of stroke. FAST is a simple way to remember. Recognizing these symptoms helps you know when to call for medical help. FAST stands for: 
· Face drooping. · Arm weakness. · Speech difficulty. · Time to call 911. Follow-up care is a key part of your treatment and safety. Be sure to make and go to all appointments, and call your doctor if you are having problems. It's also a good idea to know your test results and keep a list of the medicines you take. Where can you learn more? Go to http://sima-refugio.info/. Enter G583 in the search box to learn more about \"Learning About How to Prevent Another Stroke. \" Current as of: September 26, 2018 Content Version: 11.9 © 0460-9922 [employer]. Care instructions adapted under license by [employer] (which disclaims liability or warranty for this information). If you have questions about a medical condition or this instruction, always ask your healthcare professional. [place] [address] any warranty or liability for your use of this information. High Cholesterol: Care Instructions Your Care Instructions Cholesterol is a type of fat in your blood. It is needed for many body functions, such as making new cells. Cholesterol is made by your body. It also comes from food you eat. High cholesterol means that you have too much of the fat in your blood. This raises your risk of a heart attack and stroke. LDL and HDL are part of your total cholesterol. LDL is the \"bad\" cholesterol. High LDL can raise your risk for heart disease, heart attack, and stroke. HDL is the \"good\" cholesterol. It helps clear bad cholesterol from the body. High HDL is linked with a lower risk of heart disease, heart attack, and stroke. Your cholesterol levels help your doctor find out your risk for having a heart attack or stroke. You and your doctor can talk about whether you need to lower your risk and what treatment is best for you. A heart-healthy lifestyle along with medicines can help lower your cholesterol and your risk. The way you choose to lower your risk will depend on how high your risk is for heart attack and stroke. It will also depend on how you feel about taking medicines. Follow-up care is a key part of your treatment and safety. Be sure to make and go to all appointments, and call your doctor if you are having problems. It's also a good idea to know your test results and keep a list of the medicines you take. How can you care for yourself at home? · Eat a variety of foods every day. Good choices include fruits, vegetables, whole grains (like oatmeal), dried beans and peas, nuts and seeds, soy products (like tofu), and fat-free or low-fat dairy products. · Replace butter, margarine, and hydrogenated or partially hydrogenated oils with olive and canola oils. (Canola oil margarine without trans fat is fine.) · Replace red meat with fish, poultry, and soy protein (like tofu). · Limit processed and packaged foods like chips, crackers, and cookies. · Bake, broil, or steam foods. Don't johnson them. · Be physically active. Get at least 30 minutes of exercise on most days of the week. Walking is a good choice. You also may want to do other activities, such as running, swimming, cycling, or playing tennis or team sports.  
· Stay at a healthy weight or lose weight by making the changes in eating and physical activity listed above. Losing just a small amount of weight, even 5 to 10 pounds, can reduce your risk for having a heart attack or stroke. · Do not smoke. When should you call for help? Watch closely for changes in your health, and be sure to contact your doctor if: 
  · You need help making lifestyle changes.  
  · You have questions about your medicine. Where can you learn more? Go to http://sima-refugio.info/. Enter Q520 in the search box to learn more about \"High Cholesterol: Care Instructions. \" Current as of: July 22, 2018 Content Version: 11.9 © 1654-8648 [employer]. Care instructions adapted under license by [employer] (which disclaims liability or warranty for this information). If you have questions about a medical condition or this instruction, always ask your healthcare professional. [address] any warranty or liability for your use of this information. Neuropathic Pain: Care Instructions Your Care Instructions Neuropathic pain is caused by pressure on or damage to your nerves. It's often simply called nerve pain. Some people feel this type of pain all the time. For others, it comes and goes. Diabetes, shingles, or an injury can cause nerve pain. Many people say the pain feels sharp, burning, or stabbing. But some people feel it as a dull ache. In some cases, it makes your skin very sensitive. So touch, pressure, and other sensations that did not hurt before may now cause pain. It's important to know that this kind of pain is real and can affect your quality of life. It's also important to know that treatment can help. Treatment includes pain medicines, exercise, and physical therapy. Medicines can help reduce the number of pain signals that travel over the nerves. This can make the painful areas less sensitive.  It can also help you sleep better and improve your mood. But medicines are only one part of successful treatment. Most people do best with more than one kind of treatment. Your doctor may recommend that you try cognitive-behavioral therapy and stress management. Or, if needed, you may decide to try to quit smoking, lower your blood pressure, or better control blood sugar. These kinds of healthy changes can also make a difference. If you feel that your treatment is not working, talk to your doctor. And be sure to tell your doctor if you think you might be depressed or anxious. These are common problems that can also be treated. Follow-up care is a key part of your treatment and safety. Be sure to make and go to all appointments, and call your doctor if you are having problems. It's also a good idea to know your test results and keep a list of the medicines you take. How can you care for yourself at home? · Be safe with medicines. Read and follow all instructions on the label. ? If the doctor gave you a prescription medicine for pain, take it as prescribed. ? If you are not taking a prescription pain medicine, ask your doctor if you can take an over-the-counter medicine. · Save hard tasks for days when you have less pain. Follow a hard task with an easy task. And remember to take breaks. · Relax, and reduce stress. You may want to try deep breathing or meditation. These can help. · Keep moving. Gentle, daily exercise can help reduce pain. Your doctor or physical therapist can tell you what type of exercise is best for you. This may include walking, swimming, and stationary biking. It may also include stretches and range-of-motion exercises. · Try heat, cold packs, and massage. · Get enough sleep. Constant pain can make you more tired. If the pain makes it hard to sleep, talk with your doctor. · Think positively. Your thoughts can affect your pain.  Do fun things to distract yourself from the pain. See a movie, read a book, listen to music, or spend time with a friend. · Keep a pain diary. Try to write down how strong your pain is and what it feels like. Also try to notice and write down how your moods, thoughts, sleep, activities, and medicine affect your pain. These notes can help you and your doctor find the best ways to treat your pain. Reducing constipation caused by pain medicine Pain medicines often cause constipation. To reduce constipation: 
· Include fruits, vegetables, beans, and whole grains in your diet each day. These foods are high in fiber. · Drink plenty of fluids, enough so that your urine is light yellow or clear like water. If you have kidney, heart, or liver disease and have to limit fluids, talk with your doctor before you increase the amount of fluids you drink. · Get some exercise every day. Build up slowly to 30 to 60 minutes a day on 5 or more days of the week. · Take a fiber supplement, such as Citrucel or Metamucil, every day if needed. Read and follow all instructions on the label. · Schedule time each day for a bowel movement. Having a daily routine may help. Take your time and do not strain when having a bowel movement. · Ask your doctor about a laxative. The goal is to have one easy bowel movement every 1 to 2 days. Do not let constipation go untreated for more than 3 days. When should you call for help? Call your doctor now or seek immediate medical care if: 
  · You feel sad, anxious, or hopeless for more than a few days. This could mean you are depressed. Depression is common in people who have a lot of pain. But it can be treated.  
  · You have trouble with bowel movements, such as: 
? No bowel movement in 3 days. ? Blood in the anal area, in your stool, or on the toilet paper. ? Diarrhea for more than 24 hours.  
 Watch closely for changes in your health, and be sure to contact your doctor if: 
  · Your pain is getting worse.   · You can't sleep because of pain.  
  · You are very worried or anxious about your pain.  
  · You have trouble taking your pain medicine.  
  · You have any concerns about your pain medicine or its side effects.  
  · You have vomiting or cramps for more than 2 hours. Where can you learn more? Go to http://sima-refugio.info/. Enter Q414 in the search box to learn more about \"Neuropathic Pain: Care Instructions. \" Current as of: Neris 3, 2018 Content Version: 11.9 © 7531-0260 [employer]. Care instructions adapted under license by [employer] (which disclaims liability or warranty for this information). If you have questions about a medical condition or this instruction, always ask your healthcare professional. [address] any warranty or liability for your use of this information.

## 2019-03-01 ENCOUNTER — TELEPHONE (OUTPATIENT)
Dept: NEUROLOGY | Age: 54
End: 2019-03-01

## 2019-03-01 ENCOUNTER — HOSPITAL ENCOUNTER (OUTPATIENT)
Dept: NON INVASIVE DIAGNOSTICS | Age: 54
Discharge: HOME | End: 2019-03-01
Attending: NURSE PRACTITIONER
Payer: COMMERCIAL

## 2019-03-01 VITALS
OXYGEN SATURATION: 100 % | DIASTOLIC BLOOD PRESSURE: 64 MMHG | HEART RATE: 68 BPM | RESPIRATION RATE: 14 BRPM | SYSTOLIC BLOOD PRESSURE: 117 MMHG

## 2019-03-01 DIAGNOSIS — Q21.12 PFO (PATENT FORAMEN OVALE): ICD-10-CM

## 2019-03-01 PROCEDURE — 99152 MOD SED SAME PHYS/QHP 5/>YRS: CPT

## 2019-03-01 PROCEDURE — 74011000250 HC RX REV CODE- 250: Performed by: INTERNAL MEDICINE

## 2019-03-01 PROCEDURE — 74011250636 HC RX REV CODE- 250/636

## 2019-03-01 PROCEDURE — 93325 DOPPLER ECHO COLOR FLOW MAPG: CPT

## 2019-03-01 PROCEDURE — 99153 MOD SED SAME PHYS/QHP EA: CPT

## 2019-03-01 RX ORDER — FENTANYL CITRATE 50 UG/ML
25-50 INJECTION, SOLUTION INTRAMUSCULAR; INTRAVENOUS
Status: DISCONTINUED | OUTPATIENT
Start: 2019-03-01 | End: 2019-03-01

## 2019-03-01 RX ORDER — MIDAZOLAM HYDROCHLORIDE 1 MG/ML
.5-1 INJECTION, SOLUTION INTRAMUSCULAR; INTRAVENOUS
Status: DISCONTINUED | OUTPATIENT
Start: 2019-03-01 | End: 2019-03-01

## 2019-03-01 RX ORDER — LIDOCAINE HYDROCHLORIDE 20 MG/ML
15 SOLUTION OROPHARYNGEAL ONCE
Status: COMPLETED | OUTPATIENT
Start: 2019-03-01 | End: 2019-03-01

## 2019-03-01 RX ADMIN — LIDOCAINE HYDROCHLORIDE 15 ML: 20 SOLUTION ORAL; TOPICAL at 12:54

## 2019-03-01 RX ADMIN — MIDAZOLAM HYDROCHLORIDE 1 MG: 1 INJECTION, SOLUTION INTRAMUSCULAR; INTRAVENOUS at 12:59

## 2019-03-01 RX ADMIN — FENTANYL CITRATE 25 MCG: 50 INJECTION, SOLUTION INTRAMUSCULAR; INTRAVENOUS at 13:01

## 2019-03-01 RX ADMIN — MIDAZOLAM HYDROCHLORIDE 1 MG: 1 INJECTION, SOLUTION INTRAMUSCULAR; INTRAVENOUS at 13:17

## 2019-03-01 RX ADMIN — MIDAZOLAM HYDROCHLORIDE 1 MG: 1 INJECTION, SOLUTION INTRAMUSCULAR; INTRAVENOUS at 13:11

## 2019-03-01 RX ADMIN — BENZOCAINE, BUTAMBEN, AND TETRACAINE HYDROCHLORIDE 3 SPRAY: .028; .004; .004 AEROSOL, SPRAY TOPICAL at 12:56

## 2019-03-01 RX ADMIN — MIDAZOLAM HYDROCHLORIDE 1 MG: 1 INJECTION, SOLUTION INTRAMUSCULAR; INTRAVENOUS at 13:19

## 2019-03-01 RX ADMIN — FENTANYL CITRATE 25 MCG: 50 INJECTION, SOLUTION INTRAMUSCULAR; INTRAVENOUS at 13:14

## 2019-03-01 NOTE — PROGRESS NOTES
Patient arrived to [facility]-Invasive Cardiology Lab for Out Patient VENITA Procedure. Staff introduced to patient. Patient identifiers verified with Name and Date of Birth. Procedure verified with patient. Consent forms reviewed and signed by patient or authorized representative and verified. Allergies verified. Patient informed of procedure and plan of care. Questions answered with review. Patient on cardiac monitor, non-invasive blood pressure, SPO2 monitor. On Room air. Patient is A&Ox3. Patient reports no complaints. Patient on stretcher, in low position, with side rails up. Patient instructed to call for assistance as needed. Family in waiting room.

## 2019-03-01 NOTE — TELEPHONE ENCOUNTER
Says that the amitriptyline isn't working. He is still in a tremendous amount of pain. Patient is took 2 the first night and then took 3 last night and doesn't seem to be getting any relief. Not all at the same time the dosage is scattered as he isn't getting relief. Patient is have a T.E.E. Today. Please advise.

## 2019-03-01 NOTE — TELEPHONE ENCOUNTER
Patient called to let Dr. Rich Fritz know that the Amitriptyline is not working, as he is still having a lot of pain. He would like to know if he can be prescribed something else.       Please call [phone], he said that he is having a procedure done today, and if he doesn't answer to please call his wife [phone]

## 2019-03-04 NOTE — TELEPHONE ENCOUNTER
Amitriptyline may take time to offer benefit. If patient calls back with same issue then I will prescribed him a 5 day course of Ketorolac and Medrol steroid dosepak.

## 2019-03-06 NOTE — TELEPHONE ENCOUNTER
Spoke with patient and told him it may take time before he sees improvement from the amitiptyline but if he doesn't see an improvement in another week to call back and let us know.

## 2019-03-08 ENCOUNTER — CLINICAL SUPPORT (OUTPATIENT)
Dept: CARDIOLOGY CLINIC | Age: 54
End: 2019-03-08

## 2019-03-08 DIAGNOSIS — Q21.12 PFO (PATENT FORAMEN OVALE): ICD-10-CM

## 2019-03-12 ENCOUNTER — TELEPHONE (OUTPATIENT)
Dept: NEUROLOGY | Age: 54
End: 2019-03-12

## 2019-03-12 RX ORDER — METHYLPREDNISOLONE 4 MG/1
TABLET ORAL
Qty: 1 DOSE PACK | Refills: 0 | Status: SHIPPED | OUTPATIENT
Start: 2019-03-12 | End: 2019-04-04

## 2019-03-12 RX ORDER — CLINDAMYCIN HYDROCHLORIDE 150 MG/1
CAPSULE ORAL
Refills: 0 | COMMUNITY
Start: 2019-03-05 | End: 2019-04-04

## 2019-03-12 NOTE — TELEPHONE ENCOUNTER
Patient called and stated he was not feeling any better. He stated that Dr. Trever Chanel and him had discussed a steroid and possible other medications. He is requesting those medications. Please advise.

## 2019-03-12 NOTE — TELEPHONE ENCOUNTER
Spoke with patient and advised him that the C/ [address]y [address] had been prescribed and sent to his pharmacy

## 2019-03-18 ENCOUNTER — TELEPHONE (OUTPATIENT)
Dept: NEUROLOGY | Age: 54
End: 2019-03-18

## 2019-03-18 NOTE — TELEPHONE ENCOUNTER
I does not matter how or what machine they use as long as they test his peripheral vision and send me a copy of the results that would be fine.

## 2019-03-18 NOTE — TELEPHONE ENCOUNTER
Patient said that Dr. Ramon Else  had stated that he needed a [facility] vision test. The eye  said it was a brand name and he has another machine that is the peripheral vision test. He doesn't want to pay for something that is not needed.      Also needs an extraction but oral surgeon wants to speak with Dr. Tristen Azar first.

## 2019-03-18 NOTE — TELEPHONE ENCOUNTER
Please call, has a question about his vision test. Wants to make sure he has the right test ordered.

## 2019-03-18 NOTE — TELEPHONE ENCOUNTER
Spoke with patient and said per Dr. Gibson Blakely it doesn't matter the machine as long as the peripheral vision is tested and we get the results of that test.

## 2019-04-04 ENCOUNTER — OFFICE VISIT (OUTPATIENT)
Dept: NEUROLOGY | Age: 54
End: 2019-04-04

## 2019-04-04 VITALS
HEIGHT: 70 IN | SYSTOLIC BLOOD PRESSURE: 110 MMHG | DIASTOLIC BLOOD PRESSURE: 74 MMHG | HEART RATE: 72 BPM | OXYGEN SATURATION: 98 % | BODY MASS INDEX: 26.77 KG/M2 | WEIGHT: 187 LBS

## 2019-04-04 DIAGNOSIS — I63.412 CEREBROVASCULAR ACCIDENT (CVA) DUE TO EMBOLISM OF LEFT MIDDLE CEREBRAL ARTERY (HCC): Primary | ICD-10-CM

## 2019-04-04 DIAGNOSIS — M79.2 NEURALGIA: ICD-10-CM

## 2019-04-04 DIAGNOSIS — E78.5 HYPERLIPIDEMIA, UNSPECIFIED HYPERLIPIDEMIA TYPE: ICD-10-CM

## 2019-04-04 PROBLEM — Z86.73 HISTORY OF CVA (CEREBROVASCULAR ACCIDENT): Status: ACTIVE | Noted: 2019-04-04

## 2019-04-04 PROBLEM — Q21.12 PFO (PATENT FORAMEN OVALE): Status: RESOLVED | Noted: 2019-02-21 | Resolved: 2019-04-04

## 2019-04-04 RX ORDER — AMITRIPTYLINE HYDROCHLORIDE 25 MG/1
50 TABLET, FILM COATED ORAL
Qty: 60 TAB | Refills: 2 | Status: SHIPPED | OUTPATIENT
Start: 2019-04-04 | End: 2019-08-22

## 2019-04-04 RX ORDER — PENICILLIN V POTASSIUM 250 MG/1
TABLET, FILM COATED ORAL 4 TIMES DAILY
COMMUNITY
End: 2019-08-22

## 2019-04-04 NOTE — PROGRESS NOTES
NEUROLOGY CLINIC NOTE Patient ID: 
Camilla Arroyo 
[id number] 
48 y.o. 
1965 Date of Visit:  April 4, 2019 Reason for Visit:  Stroke, headache, facial pain Chief Complaint Patient presents with  Follow-up  
  still having epsiodes of jaw pain. headahces are gone. History of Present Illness:  
 
Patient Active Problem List  
 Diagnosis Date Noted  History of CVA (cerebrovascular accident) 04/04/2019 Past Medical History:  
Diagnosis Date  Cerebral artery occlusion with cerebral infarction ([facility] [address]  Hyperlipidemia No past surgical history on file. Prior to Admission medications Medication Sig Start Date End Date Taking? Authorizing Provider  
penicillin v potassium (VEETID) 250 mg tablet Take  by mouth four (4) times daily. Yes Provider, Historical  
amitriptyline (ELAVIL) 10 mg tablet Take 3 Tabs by mouth nightly. Take 1 tab at bedtime x 1 wk; then 2 tab at bedtime x 1 wk; then 3 tab at bedtime 2/27/19  Yes Hershall Cease., MD  
aspirin delayed-release 81 mg tablet Take  by mouth daily. Yes Provider, Historical  
atorvastatin (LIPITOR) 40 mg tablet Take  by mouth daily. Yes Provider, Historical  
doxylamine succinate (UNISOM) 25 mg tablet Take 25 mg by mouth nightly as needed for Sleep. Yes Provider, Historical  
melatonin 5 mg cap capsule Take 5 mg by mouth nightly. Yes Provider, Historical  
multivitamin (ONE A DAY) tablet Take 1 Tab by mouth daily. Yes Provider, Historical  
glucosam-chond-hyalu-CF borate (MOVE FREE [employer]) 750 mg-100 mg- 1.65 mg-108 mg tab Take  by mouth. Yes Provider, Historical  
sildenafil citrate (VIAGRA) 25 mg tablet Take 20 mg by mouth as needed.    Yes Provider, Historical  
clindamycin (CLEOCIN) 150 mg capsule TAKE 2 CAPSULES BY MOUTH NOW ,THEN 1 CAPSULE EVERY 6 HOURS UNTIL GONE 3/5/19   Provider, Historical  
methylPREDNISolone (MEDROL DOSEPACK) 4 mg tablet Per dose pack instructions 3/12/19   Karolyn Beckham MD  
 
Allergies Allergen Reactions  Penicillins Rash Social History Tobacco Use  Smoking status: Never Smoker  Smokeless tobacco: Never Used Substance Use Topics  Alcohol use: Yes Alcohol/week: 3.6 oz Types: 3 Glasses of wine, 3 Cans of beer per week Comment: per week Family History Problem Relation Age of Onset  Stroke Mother  Heart Disease Mother Subjective:  
  
Roel Gregory is a 48 y.o. RHWM with history of recent CVA, PFO, hyperlipidemia and ED who is here for further evaluation and management after a recent stroke. Per patient and records condition started 1/28/2019. Patient was in a meeting at around 8 10 in the morning and was giving a talk. Suddenly developed problems speaking and noted weakness of the right arm and leg. Patient was brought to [facility] and diagnosed to have a left MCA stroke with left MCA occlusion. Initial NIH stroke scale was 12. Patient received IV TPA. Patient was then transferred to [facility] for possible mechanical thrombectomy. Upon arriving at [facility] his right hemiparesis improved significantly. Only residual was his aphasia. Repeat NIH stroke scale was 2. Neuro exam mainly revealed issues with aphasia. CTA of the head and neck and CT perfusion revealed no evidence of vessel occlusion. Internal and external carotid arteries are patent. No evidence of aneurysm. CT perfusion revealed findings consistent with large penumbra within the left MCA distribution with small area of core infarction. Transthoracic echocardiogram revealed EF of 55-60%. With Valsalva there are small amounts of bubbles noted in the left atrium immediately after injection. This may be consistent with a small PFO. Neurosurgery saw the patient and no intervention was necessary given the improvement.   Patient was seen by physical medicine and rehab specialist and recommended outpatient speech therapy. Speech therapy note reviewed and noted very subtle deficits and patient is able to use compensatory strategies to assist with verbal output. No speech therapy services warranted upon discharge. Repeat head CT 24 hours post TPA revealed evolving posterior temporal lobe infarction with punctate hemorrhage. Old left posterolateral cerebellar infarction. Brain MRI was done 1/29/2019 and revealed small posterior left temporal lobe acute infarction and punctate focus of tiny amount of petechial hemorrhage. No mass-effect. Small focus of encephalomalacia in the superior aspect of the left cerebellar hemisphere posterolaterally. Lower extremity Doppler studies did not reveal any DVT. LDL was 116 and patient was started on atorvastatin 40 mg daily. Patient also started on aspirin 81 mg daily for stroke prophylaxis. Hypercoagulable workup consisting of ESR, JOSEE, syphilis, HIV, homocysteine levels were done and were unremarkable. Patient was discharged 1/29/2019. Since then, patient reports he is doing fine. He is compliant with his aspirin 81 mg daily and atorvastatin 40 mg daily. He received a event monitor from [facility] and was unclear as to how to proceed with it. No issues with his speech or thought process. No residual right arm and leg weakness. 2/8/2019, hypercoagulable workup was unremarkable (lupus anticoagulant, protein C deficiency, factor V leiden, homocysteine, plasminogen, fibrinogen antigen, protein S panel and factor VIII antigen). Head CT without contrast done 2/20/2019 revealed small focal area of encephalomalacia left posterior superior lateral temporal lobe corresponding to infarct. No acute intracranial abnormality demonstrated. No hemorrhage seen. EEG done 2/22/2019 was normal awake and sleep patterns.  
 
Since the last visit on 2/27/2019, patient called in between complaining of persistent issues with throbbing pain over and inside the left ear radiating towards the jaw area and behind the left eye. Moderate to severe pain. No associated signs and symptoms. Lasts for several minutes. Patient was prescribed use Medrol Dosepak which per patient offered benefit in stopping his headaches. Now is having mainly ear and jaw discomforts. Patient saw his dentist and found that he may have issues with infection that seems to involve the bone. Currently on penicillin with benefit and is awaiting surgery. He is taking Amitriptyline 30 mg at bedtime. Denies any side effects. Pain seems to occur mostly at night when he is in bed. Laying flat makes it worse and better when propped up. Patient reports no recurrence of his left arm feeling numb and heavy. Patient has been seen by cardiology. VENITA was done 3/1/2019 and revealed EF of 56-60%. Mild to moderate mitral valve regurgitation. Mild aortic valve regurgitation. No PFO. Patient currently has a loop monitor on. Patient did see his eye doctor but was referred out to [facility] clinic for formal [place] visual field testing that is schedule at the end of this month. Outside reports reviewed: VENITA Review of Systems: A comprehensive review of systems was performed:  
Constitutional: positive for none Eyes: positive for none Ears, nose, mouth, throat, and face: positive for ear and jaw pain Respiratory: positive for none Cardiovascular: positive for none Gastrointestinal: positive for none Genitourinary: positive for none Integument/breast: positive for none Hematologic/lymphatic: positive for none Musculoskeletal: positive for none Neurological: positive for none Behavioral/Psych: positive for none Endocrine: positive for none Allergic/Immunologic: positive for none Objective:  
 
Visit Vitals /74 Pulse 72 Ht 5' 10\" (1.778 m) Wt 187 lb (84.8 kg) SpO2 98% BMI 26.83 kg/m² 1/10 left jaw pain PHYSICAL EXAM: 
 
NEUROLOGICAL EXAM: 
 
 Appearance: The patient is well developed, well nourished, provides a coherent history and is in no acute distress. Mental Status: Oriented to time, place and person. Fluent, no aphasia or dysarthria. Mood and affect appropriate. Cranial Nerves:   II - XII were intact. Motor:  5/5 strength in upper and lower proximal and distal muscles. Normal bulk and tone. No pronator drift. Reflexes:   Deep tendon reflexes 2+/4 and symmetrical. Downgoing toes. Sensory:   Normal to cold and vibration. Gait:  Normal gait. No Romberg. Can do tandem walking. Tremor:   No tremor noted. Cerebellar:  Intact FTN/GISSELLE/HTS. Neurovascular:  Normal heart sounds and regular rhythm, peripheral pulses intact, and no carotid bruits. Assessment:  
CVA, embolic left MCA Neuralgia Hyperlipidemia Plan:  
Neurological examination is unchanged. It is still nonfocal.  No residual cognitive or right-sided weakness. Previous cognitive testing score was 30/30. Brain MRI did reveal small left posterior temporal lobe infarct with punctate hemorrhage. Repeat head CT without contrast did not reveal any hemorrhage. Left small posterior temporal lobe encephalomalacia. Negative hypercoagulable workup. Patient was advised likely the previous symptoms of left arm numbness and heaviness may have represented a TIA. Advised next time it happens to call 911 for immediate evaluation. He understood. Continue ECASA 81 mg daily for stroke prophylaxis. Patient was advised per DMV regulation he cannot drive for at least 3 months from his stroke pending reevaluation. Awaiting [place] visual field testing. EEG was normal awake and sleep patterns. Exam reveals elements of occipital neuralgia and possible left trigeminal neuralgia. Increase Amitriptyline up to 50 mg at bedtime. Prescription was provided. Okay to take over-the-counter medications for abortive therapy for breakthrough pain. Laboratory workup at [facility] revealed elevated LDL. Should be less than 70. Continue atorvastatin 40 mg daily. Patient was advised strict compliance with dietary modifications and medications for hyperlipidemia. He has been seen by cardiology and VENITA showed no PFO. Currently on a loop recorder to assess for paroxysmal arrhythmias. All questions and concerns were answered. This note was created using voice recognition software. Despite editing, there may be syntax errors.

## 2019-04-04 NOTE — LETTER
4/4/19 Patient: Valentino Curry YOB: 1965 Date of Visit: 4/4/2019 Therese Thompson MD 
[address] [place] [address] [address] [phone] VIA Facsimile: [phone] Dear Therese Thompson MD, Thank you for referring Mr. Chelsie Albert to [address] [place] [address] for evaluation. My notes for this consultation are attached. If you have questions, please do not hesitate to call me. I look forward to following your patient along with you. Sincerely, Rocío Chow MD

## 2019-04-04 NOTE — PATIENT INSTRUCTIONS
Learning About How to Prevent a Stroke What is a stroke? A stroke occurs when a blood vessel in the brain bursts or is blocked by a blood clot. Without blood and the oxygen it carries, part of the brain starts to die. The part of the body controlled by the damaged area of the brain can't work properly. But there are many things you can do to help lower your stroke risk. What increases your risk for stroke? A risk factor is anything that makes you more likely to have a particular health problem. Risk factors for stroke that you can treat or change include: 
· Health problems like high blood pressure, atrial fibrillation, diabetes, and high cholesterol. · Smoking. · Heavy use of alcohol. · Being overweight. · Not getting enough physical activity. Risk factors you can't change include: · Age. The risk of stroke goes up as you get older. · Race.  Americans, Native Americans, and [place] Natives have a higher risk than those of other races. · Being female. Women have a higher risk of stroke than men. · Family history of stroke. Your doctor can help you know your risk. Then you and your can doctor talk about whether you need to lower it. What can you do to prevent a stroke? · Treat any health problems you have that raise your risk. · Adopt a heart-healthy lifestyle: ? Don't smoke. If you need help quitting, talk to your doctor about stop-smoking programs and medicines. These can increase your chances of quitting for good. ? Limit alcohol to 2 drinks a day for men and 1 drink a day for women. ? Stay at a healthy weight. Lose weight if you need to. 
? If your doctor recommends it, get more exercise. Walking is a good choice. Bit by bit, increase the amount you walk every day. Try for at least 30 minutes on most days of the week. ? Eat heart-healthy foods. These include fruits, vegetables, high-fiber foods, and fish. Choose foods that are low in sodium, saturated fat, and trans fat. · Decide with your doctor whether you will also take medicines to help lower your risk. For example, you and your doctor may decide you will take a medicine that prevents blood clots. What are the symptoms of a stroke? The brain damage from a stroke starts within minutes. That's why it's so important to know the symptoms of stroke and to act fast. Quick treatment can help limit damage to the brain so that you have fewer problems after the stroke. FAST is a simple way to remember the main symptoms of stroke. Recognizing these symptoms helps you know when to call for medical help. FAST stands for: 
· Face drooping. · Arm weakness. · Speech difficulty. · Time to call 911. Follow-up care is a key part of your treatment and safety. Be sure to make and go to all appointments, and call your doctor if you are having problems. It's also a good idea to know your test results and keep a list of the medicines you take. Where can you learn more? Go to http://sima-refugio.info/. Enter G757 in the search box to learn more about \"Learning About How to Prevent a Stroke. \" Current as of: September 26, 2018 Content Version: 11.9 © 9824-7796 [employer]. Care instructions adapted under license by [employer] (which disclaims liability or warranty for this information). If you have questions about a medical condition or this instruction, always ask your healthcare professional. [place] [address] any warranty or liability for your use of this information. Learning About How to Prevent Another Stroke What can you do to prevent another stroke? After a stroke, people feel lots of different emotions. Some people are worried that they could have another stroke. Or they may feel overwhelmed by how much there is to learn and do. Some people feel sad or depressed.  
No matter what emotions you are feeling, you can give yourself some control and peace of mind by making a plan to lower your risk of having another stroke. Take your medicines You'll need to take medicines to help prevent another stroke. Be sure to take your medicines exactly as prescribed. And don't stop taking them unless your doctor tells you to. If you stop taking your medicines, you can increase your risk of having another stroke. Some of the medicines your doctor may prescribe include: · Aspirin or some other blood thinner to prevent blood clots. · Statins to lower cholesterol. · Blood pressure medicines to lower blood pressure. Manage other health problems You can help lower your chance of having another stroke by managing certain other health problems. Problems that increase your risk of having another stroke include: · High blood pressure. · High cholesterol. · Atrial fibrillation. · Diabetes. If you have any of these health problems, you can manage them with healthy lifestyle changes along with medicine. Adopt a healthy lifestyle · Do not smoke or allow others to smoke around you. If you need help quitting, talk to your doctor about stop-smoking programs and medicines. These can increase your chances of quitting for good. Smoking makes a stroke more likely. · Limit alcohol to 2 drinks a day for men and 1 drink a day for women. · Lose weight if you need to. Controlling your weight will help you keep your heart and body healthy. · Be active. Ask your doctor what type and level of activity is safe for you. · Eat heart-healthy foods, like fruits, vegetables, and high-fiber foods. It's also important to: · Get a flu shot every year. · Ask for help if you think you are depressed. Do stroke rehab Taking part in a stroke rehabilitation (rehab) program will help you to regain skills you lost or make the most of your abilities after a stroke. It also helps you take steps to prevent another stroke. Your rehab team will give you education and support to help you build new, healthy habits. You'll learn how to manage any other health problems that you might have. Josefa Thayer also learn how to exercise safely, eat a healthy diet, and quit smoking if you smoke. Josefa Thayer work with your team to decide what lifestyle choices are best for you. If your doctor hasn't already suggested it, ask him or her if stroke rehab is right for you. Know stroke symptoms Make sure you know the symptoms of stroke. FAST is a simple way to remember. Recognizing these symptoms helps you know when to call for medical help. FAST stands for: 
· Face drooping. · Arm weakness. · Speech difficulty. · Time to call 911. Follow-up care is a key part of your treatment and safety. Be sure to make and go to all appointments, and call your doctor if you are having problems. It's also a good idea to know your test results and keep a list of the medicines you take. Where can you learn more? Go to http://sima-refugio.info/. Enter P057 in the search box to learn more about \"Learning About How to Prevent Another Stroke. \" Current as of: September 26, 2018 Content Version: 11.9 © 3825-2691 [employer]. Care instructions adapted under license by [employer] (which disclaims liability or warranty for this information). If you have questions about a medical condition or this instruction, always ask your healthcare professional. [place] [address] any warranty or liability for your use of this information. Neuropathic Pain: Care Instructions Your Care Instructions Neuropathic pain is caused by pressure on or damage to your nerves. It's often simply called nerve pain. Some people feel this type of pain all the time. For others, it comes and goes. Diabetes, shingles, or an injury can cause nerve pain.  Many people say the pain feels sharp, burning, or stabbing. But some people feel it as a dull ache. In some cases, it makes your skin very sensitive. So touch, pressure, and other sensations that did not hurt before may now cause pain. It's important to know that this kind of pain is real and can affect your quality of life. It's also important to know that treatment can help. Treatment includes pain medicines, exercise, and physical therapy. Medicines can help reduce the number of pain signals that travel over the nerves. This can make the painful areas less sensitive. It can also help you sleep better and improve your mood. But medicines are only one part of successful treatment. Most people do best with more than one kind of treatment. Your doctor may recommend that you try cognitive-behavioral therapy and stress management. Or, if needed, you may decide to try to quit smoking, lower your blood pressure, or better control blood sugar. These kinds of healthy changes can also make a difference. If you feel that your treatment is not working, talk to your doctor. And be sure to tell your doctor if you think you might be depressed or anxious. These are common problems that can also be treated. Follow-up care is a key part of your treatment and safety. Be sure to make and go to all appointments, and call your doctor if you are having problems. It's also a good idea to know your test results and keep a list of the medicines you take. How can you care for yourself at home? · Be safe with medicines. Read and follow all instructions on the label. ? If the doctor gave you a prescription medicine for pain, take it as prescribed. ? If you are not taking a prescription pain medicine, ask your doctor if you can take an over-the-counter medicine. · Save hard tasks for days when you have less pain. Follow a hard task with an easy task. And remember to take breaks. · Relax, and reduce stress.  You may want to try deep breathing or meditation. These can help. · Keep moving. Gentle, daily exercise can help reduce pain. Your doctor or physical therapist can tell you what type of exercise is best for you. This may include walking, swimming, and stationary biking. It may also include stretches and range-of-motion exercises. · Try heat, cold packs, and massage. · Get enough sleep. Constant pain can make you more tired. If the pain makes it hard to sleep, talk with your doctor. · Think positively. Your thoughts can affect your pain. Do fun things to distract yourself from the pain. See a movie, read a book, listen to music, or spend time with a friend. · Keep a pain diary. Try to write down how strong your pain is and what it feels like. Also try to notice and write down how your moods, thoughts, sleep, activities, and medicine affect your pain. These notes can help you and your doctor find the best ways to treat your pain. Reducing constipation caused by pain medicine Pain medicines often cause constipation. To reduce constipation: 
· Include fruits, vegetables, beans, and whole grains in your diet each day. These foods are high in fiber. · Drink plenty of fluids, enough so that your urine is light yellow or clear like water. If you have kidney, heart, or liver disease and have to limit fluids, talk with your doctor before you increase the amount of fluids you drink. · Get some exercise every day. Build up slowly to 30 to 60 minutes a day on 5 or more days of the week. · Take a fiber supplement, such as Citrucel or Metamucil, every day if needed. Read and follow all instructions on the label. · Schedule time each day for a bowel movement. Having a daily routine may help. Take your time and do not strain when having a bowel movement. · Ask your doctor about a laxative. The goal is to have one easy bowel movement every 1 to 2 days. Do not let constipation go untreated for more than 3 days. When should you call for help? Call your doctor now or seek immediate medical care if: 
  · You feel sad, anxious, or hopeless for more than a few days. This could mean you are depressed. Depression is common in people who have a lot of pain. But it can be treated.  
  · You have trouble with bowel movements, such as: 
? No bowel movement in 3 days. ? Blood in the anal area, in your stool, or on the toilet paper. ? Diarrhea for more than 24 hours.  
 Watch closely for changes in your health, and be sure to contact your doctor if: 
  · Your pain is getting worse.  
  · You can't sleep because of pain.  
  · You are very worried or anxious about your pain.  
  · You have trouble taking your pain medicine.  
  · You have any concerns about your pain medicine or its side effects.  
  · You have vomiting or cramps for more than 2 hours. Where can you learn more? Go to http://sima-refugio.info/. Enter S894 in the search box to learn more about \"Neuropathic Pain: Care Instructions. \" Current as of: Neris 3, 2018 Content Version: 11.9 © 1230-6235 [employer]. Care instructions adapted under license by [employer] (which disclaims liability or warranty for this information). If you have questions about a medical condition or this instruction, always ask your healthcare professional. [address] any warranty or liability for your use of this information. High Cholesterol: Care Instructions Your Care Instructions Cholesterol is a type of fat in your blood. It is needed for many body functions, such as making new cells. Cholesterol is made by your body. It also comes from food you eat. High cholesterol means that you have too much of the fat in your blood. This raises your risk of a heart attack and stroke. LDL and HDL are part of your total cholesterol. LDL is the \"bad\" cholesterol.  High LDL can raise your risk for heart disease, heart attack, and stroke. HDL is the \"good\" cholesterol. It helps clear bad cholesterol from the body. High HDL is linked with a lower risk of heart disease, heart attack, and stroke. Your cholesterol levels help your doctor find out your risk for having a heart attack or stroke. You and your doctor can talk about whether you need to lower your risk and what treatment is best for you. A heart-healthy lifestyle along with medicines can help lower your cholesterol and your risk. The way you choose to lower your risk will depend on how high your risk is for heart attack and stroke. It will also depend on how you feel about taking medicines. Follow-up care is a key part of your treatment and safety. Be sure to make and go to all appointments, and call your doctor if you are having problems. It's also a good idea to know your test results and keep a list of the medicines you take. How can you care for yourself at home? · Eat a variety of foods every day. Good choices include fruits, vegetables, whole grains (like oatmeal), dried beans and peas, nuts and seeds, soy products (like tofu), and fat-free or low-fat dairy products. · Replace butter, margarine, and hydrogenated or partially hydrogenated oils with olive and canola oils. (Canola oil margarine without trans fat is fine.) · Replace red meat with fish, poultry, and soy protein (like tofu). · Limit processed and packaged foods like chips, crackers, and cookies. · Bake, broil, or steam foods. Don't johnson them. · Be physically active. Get at least 30 minutes of exercise on most days of the week. Walking is a good choice. You also may want to do other activities, such as running, swimming, cycling, or playing tennis or team sports. · Stay at a healthy weight or lose weight by making the changes in eating and physical activity listed above. Losing just a small amount of weight, even 5 to 10 pounds, can reduce your risk for having a heart attack or stroke. · Do not smoke. When should you call for help? Watch closely for changes in your health, and be sure to contact your doctor if: 
  · You need help making lifestyle changes.  
  · You have questions about your medicine. Where can you learn more? Go to http://sima-refugio.info/. Enter O773 in the search box to learn more about \"High Cholesterol: Care Instructions. \" Current as of: July 22, 2018 Content Version: 11.9 © [id number] [employer]. Care instructions adapted under license by [employer] (which disclaims liability or warranty for this information). If you have questions about a medical condition or this instruction, always ask your healthcare professional. [address] any warranty or liability for your use of this information.

## 2019-04-24 NOTE — PROGRESS NOTES
Received visual evaluation by Dr. Fidel Alexander (ophthalmologist) done 4/23/2019. Clear for driving with no vision issues found. Patient should be okay to drive 7/29/8053 with no restrictions.

## 2019-04-25 ENCOUNTER — TELEPHONE (OUTPATIENT)
Dept: NEUROLOGY | Age: 54
End: 2019-04-25

## 2019-04-25 NOTE — TELEPHONE ENCOUNTER
----- Message from Agapito Swann sent at 4/25/2019  1:36 PM EDT -----  Regarding: Dr Calvin Avalos is following up with Madi Macario regarding DOT eye examination to  Indicate that pt is able to drive, that was sent over from his eye doctor. Need to know if it was signed off on. Please confirm with pt.      Best contact # [phone]

## 2019-04-25 NOTE — TELEPHONE ENCOUNTER
Patients wife called in to ask if  can drive. Per note from Dr. Ted White 4/24/19 Patient is okay to drive [birth year] with no restrictions.

## 2019-04-29 NOTE — PROGRESS NOTES
Clifton Stack,    Please call the patient and inform him that his event monitor did show arrhythmias -- atrial fibrillation. This is likely the explanation of what caused his stroke. He should start blood thinning therapy. If willing please call in Eliquis 5 mg BID. If he wants to discuss results further before starting medications, please have him make an appt with Dr. Serena Mir.     Thanks,  [employer]

## 2019-04-29 NOTE — PROGRESS NOTES
Spoke with patient regarding LOOP MONITOR test results. Verified patient with two identifiers. Advised pt to start Eliquis 5 mg BID, pt is in agreement will send to pharmacy.

## 2019-08-22 ENCOUNTER — OFFICE VISIT (OUTPATIENT)
Dept: NEUROLOGY | Age: 54
End: 2019-08-22

## 2019-08-22 VITALS
SYSTOLIC BLOOD PRESSURE: 118 MMHG | HEIGHT: 70 IN | OXYGEN SATURATION: 98 % | HEART RATE: 78 BPM | BODY MASS INDEX: 29.92 KG/M2 | WEIGHT: 209 LBS | DIASTOLIC BLOOD PRESSURE: 78 MMHG

## 2019-08-22 DIAGNOSIS — E78.5 HYPERLIPIDEMIA, UNSPECIFIED HYPERLIPIDEMIA TYPE: ICD-10-CM

## 2019-08-22 DIAGNOSIS — I63.412 CEREBROVASCULAR ACCIDENT (CVA) DUE TO EMBOLISM OF LEFT MIDDLE CEREBRAL ARTERY (HCC): Primary | ICD-10-CM

## 2019-08-22 DIAGNOSIS — I48.0 PAROXYSMAL ATRIAL FIBRILLATION (HCC): ICD-10-CM

## 2019-08-22 DIAGNOSIS — M79.2 NEURALGIA: ICD-10-CM

## 2019-08-22 NOTE — PROGRESS NOTES
NEUROLOGY CLINIC NOTE    Patient ID:  Brice Canales  [id number]  32 y.o.  1965    Date of Visit:  August 22, 2019    Reason for Visit:  Stroke, headache, facial pain    Chief Complaint   Patient presents with    Follow-up       History of Present Illness:     Patient Active Problem List    Diagnosis Date Noted    History of CVA (cerebrovascular accident) 04/04/2019       Past Medical History:   Diagnosis Date    Cerebral artery occlusion with cerebral infarction ([facility] [address]     Hyperlipidemia         No past surgical history on file. Prior to Admission medications    Medication Sig Start Date End Date Taking? Authorizing Provider   apixaban (ELIQUIS) 5 mg tablet Take 1 Tab by mouth two (2) times a day. 4/29/19  Yes Janey Mazariegos, SHEMAR   penicillin v potassium (VEETID) 250 mg tablet Take  by mouth four (4) times daily. Yes Provider, Historical   aspirin delayed-release 81 mg tablet Take  by mouth daily. Yes Provider, Historical   atorvastatin (LIPITOR) 40 mg tablet Take  by mouth daily. Yes Provider, Historical   multivitamin (ONE A DAY) tablet Take 1 Tab by mouth daily. Yes Provider, Historical   glucosam-chond-hyalu-CF borate (MOVE FREE [employer]) 750 mg-100 mg- 1.65 mg-108 mg tab Take  by mouth. Yes Provider, Historical   sildenafil citrate (VIAGRA) 25 mg tablet Take 20 mg by mouth as needed. Yes Provider, Historical   amitriptyline (ELAVIL) 25 mg tablet Take 2 Tabs by mouth nightly. Take 1 tab at bedtime x 1 wk; then 2 tab at bedtime x 1 wk; then 3 tab at bedtime 4/4/19   Shirley Watson MD     Allergies   Allergen Reactions    Penicillins Rash      Social History     Tobacco Use    Smoking status: Never Smoker    Smokeless tobacco: Never Used   Substance Use Topics    Alcohol use:  Yes     Alcohol/week: 6.0 standard drinks     Types: 3 Glasses of wine, 3 Cans of beer per week     Comment: per week      Family History   Problem Relation Age of Onset    Stroke Mother     Heart Disease Mother         Subjective:      Robb Sheppard is a 48 y.o. RHWM with history of recent CVA, PFO, hyperlipidemia and ED who is here for further evaluation and management after a recent stroke. Per patient and records condition started 1/28/2019. Patient was in a meeting at around 8 10 in the morning and was giving a talk. Suddenly developed problems speaking and noted weakness of the right arm and leg. Patient was brought to [facility] and diagnosed to have a left MCA stroke with left MCA occlusion. Initial NIH stroke scale was 12. Patient received IV TPA. Patient was then transferred to [facility] for possible mechanical thrombectomy. Upon arriving at [facility] his right hemiparesis improved significantly. Only residual was his aphasia. Repeat NIH stroke scale was 2. Neuro exam mainly revealed issues with aphasia. CTA of the head and neck and CT perfusion revealed no evidence of vessel occlusion. Internal and external carotid arteries are patent. No evidence of aneurysm. CT perfusion revealed findings consistent with large penumbra within the left MCA distribution with small area of core infarction. Transthoracic echocardiogram revealed EF of 55-60%. With Valsalva there are small amounts of bubbles noted in the left atrium immediately after injection. This may be consistent with a small PFO. Neurosurgery saw the patient and no intervention was necessary given the improvement. Patient was seen by physical medicine and rehab specialist and recommended outpatient speech therapy. Speech therapy note reviewed and noted very subtle deficits and patient is able to use compensatory strategies to assist with verbal output. No speech therapy services warranted upon discharge. Repeat head CT 24 hours post TPA revealed evolving posterior temporal lobe infarction with punctate hemorrhage. Old left posterolateral cerebellar infarction.   Brain MRI was done 1/29/2019 and revealed small posterior left temporal lobe acute infarction and punctate focus of tiny amount of petechial hemorrhage. No mass-effect. Small focus of encephalomalacia in the superior aspect of the left cerebellar hemisphere posterolaterally. Lower extremity Doppler studies did not reveal any DVT. LDL was 116 and patient was started on atorvastatin 40 mg daily. Patient also started on aspirin 81 mg daily for stroke prophylaxis. Hypercoagulable workup consisting of ESR, JOSEE, syphilis, HIV, homocysteine levels were done and were unremarkable. Patient was discharged 1/29/2019. Since then, patient reports he is doing fine. He is compliant with his aspirin 81 mg daily and atorvastatin 40 mg daily. He received a event monitor from [facility] and was unclear as to how to proceed with it. No issues with his speech or thought process. No residual right arm and leg weakness. 2/8/2019, hypercoagulable workup was unremarkable (lupus anticoagulant, protein C deficiency, factor V leiden, homocysteine, plasminogen, fibrinogen antigen, protein S panel and factor VIII antigen). Head CT without contrast done 2/20/2019 revealed small focal area of encephalomalacia left posterior superior lateral temporal lobe corresponding to infarct. No acute intracranial abnormality demonstrated. No hemorrhage seen. EEG done 2/22/2019 was normal awake and sleep patterns. Patient has been seen by cardiology. VENITA was done 3/1/2019 and revealed EF of 56-60%. Mild to moderate mitral valve regurgitation. Mild aortic valve regurgitation. No PFO. Patient currently has a loop monitor on. Since the last visit on 4/4/2019, patient reports no recurrence of his stroke symptoms. Sometimes right arm feels weak with use but better when he concentrates. Same issues with balance. Better when he concentrates. He had a loop monitor place and it did show paroxysmal atrial fibrillation. Patient was asymptomatic. He was started on Eliquis 5 mg BID. Denies any side effects except easy bruising. His jaw and headaches have improved since having dental work done. He stopped taking Amitriptyline after the last visit. Denies any adverse effect. Patient reports no recurrence of his left arm feeling numb and heavy. Received visual evaluation by Dr. Getachew Garcia (ophthalmologist) done 4/23/2019. Cleared for driving with no vision issues found. Patient started driving last 1/10/5880. No issues. Outside reports reviewed: loop recorder    Review of Systems:    A comprehensive review of systems was performed:   Constitutional: positive for none  Eyes: positive for none  Ears, nose, mouth, throat, and face: positive for none  Respiratory: positive for none  Cardiovascular: positive for none  Gastrointestinal: positive for none  Genitourinary: positive for none  Integument/breast: positive for none  Hematologic/lymphatic: positive for none  Musculoskeletal: positive for weakness  Neurological: positive for balance issues  Behavioral/Psych: positive for none  Endocrine: positive for none  Allergic/Immunologic: positive for none      Objective:     Visit Vitals  /78   Pulse 78   Ht 5' 10\" (1.778 m)   Wt 209 lb (94.8 kg)   SpO2 98%   BMI 29.99 kg/m²       PHYSICAL EXAM:    NEUROLOGICAL EXAM:    Appearance: The patient is well developed, well nourished, provides a coherent history and is in no acute distress. Mental Status: Oriented to time, place and person. Fluent, no aphasia or dysarthria. Mood and affect appropriate. Cranial Nerves:   II - XII were intact. Motor:  5/5 strength in upper and lower proximal and distal muscles. Normal bulk and tone. No pronator drift. Reflexes:   Deep tendon reflexes were symmetrical. Downgoing toes. Sensory:   Normal to cold and vibration. Gait:  Normal gait. No Romberg. Tremor:   No tremor noted. Cerebellar:  Intact FTN/GISSELLE/HTS.        Assessment:   CVA, embolic left MCA  Paroxysmal atrial fibrillation  Neuralgia  Hyperlipidemia    Plan:   Neurological examination is unchanged. It is still nonfocal.  No residual cognitive or right-sided weakness. Previous cognitive testing score was 30/30. Brain MRI did reveal small left posterior temporal lobe infarct with punctate hemorrhage. Repeat head CT without contrast did not reveal any hemorrhage. Left small posterior temporal lobe encephalomalacia. Negative hypercoagulable workup. Patient was advised likely the previous symptoms of left arm numbness and heaviness may have represented a TIA. Advised next time it happens to call 911 for immediate evaluation. He understood. Continue ECASA 81 mg daily and Eliquis 5 mg BID for stroke prophylaxis. Patient was previously release for driving without any restrictions from a neurological standpoint. Cleared by ophthalmology. EEG was normal awake and sleep patterns. He has been seen by cardiology and VENITA showed no PFO. Loop recorder revealed paroxysmal atrial fibrillation. On Eliquis 5 mg BID. Continue care with cardiology. Exam reveals elements of occipital neuralgia and possible left trigeminal neuralgia. Improved after dental work. Off of Amitriptyline. Okay to take OTC medications for abortive therapy if pain recurs. Laboratory workup at [facility] revealed elevated LDL. Should be less than 70. Continue atorvastatin 40 mg daily. Patient was advised strict compliance with dietary modifications and medications for hyperlipidemia. All questions and concerns were answered. This note was created using voice recognition software. Despite editing, there may be syntax errors.

## 2019-08-22 NOTE — LETTER
9/2/19 Patient: Bharat Spear YOB: 1965 Date of Visit: 8/22/2019 Esther Stearns MD 
[address] [place] [address] [phone] [id number] VIA Facsimile: [phone] Dear Esther Stearns MD, Thank you for referring Mr. Sue Oliver to [address] [place] [address] for evaluation. My notes for this consultation are attached. If you have questions, please do not hesitate to call me. I look forward to following your patient along with you. Sincerely, Angela Reis MD

## 2019-08-22 NOTE — PATIENT INSTRUCTIONS
High Cholesterol: Care Instructions  Your Care Instructions    Cholesterol is a type of fat in your blood. It is needed for many body functions, such as making new cells. Cholesterol is made by your body. It also comes from food you eat. High cholesterol means that you have too much of the fat in your blood. This raises your risk of a heart attack and stroke. LDL and HDL are part of your total cholesterol. LDL is the \"bad\" cholesterol. High LDL can raise your risk for heart disease, heart attack, and stroke. HDL is the \"good\" cholesterol. It helps clear bad cholesterol from the body. High HDL is linked with a lower risk of heart disease, heart attack, and stroke. Your cholesterol levels help your doctor find out your risk for having a heart attack or stroke. You and your doctor can talk about whether you need to lower your risk and what treatment is best for you. A heart-healthy lifestyle along with medicines can help lower your cholesterol and your risk. The way you choose to lower your risk will depend on how high your risk is for heart attack and stroke. It will also depend on how you feel about taking medicines. Follow-up care is a key part of your treatment and safety. Be sure to make and go to all appointments, and call your doctor if you are having problems. It's also a good idea to know your test results and keep a list of the medicines you take. How can you care for yourself at home? · Eat a variety of foods every day. Good choices include fruits, vegetables, whole grains (like oatmeal), dried beans and peas, nuts and seeds, soy products (like tofu), and fat-free or low-fat dairy products. · Replace butter, margarine, and hydrogenated or partially hydrogenated oils with olive and canola oils. (Canola oil margarine without trans fat is fine.)  · Replace red meat with fish, poultry, and soy protein (like tofu). · Limit processed and packaged foods like chips, crackers, and cookies.   · Bake, broil, or steam foods. Don't johnson them. · Be physically active. Get at least 30 minutes of exercise on most days of the week. Walking is a good choice. You also may want to do other activities, such as running, swimming, cycling, or playing tennis or team sports. · Stay at a healthy weight or lose weight by making the changes in eating and physical activity listed above. Losing just a small amount of weight, even 5 to 10 pounds, can reduce your risk for having a heart attack or stroke. · Do not smoke. When should you call for help? Watch closely for changes in your health, and be sure to contact your doctor if:    · You need help making lifestyle changes.     · You have questions about your medicine. Where can you learn more? Go to http://sima-refugio.info/. Enter O222 in the search box to learn more about \"High Cholesterol: Care Instructions. \"  Current as of: July 22, 2018  Content Version: 12.1  © 8073-4746 [employer] Care instructions adapted under license by [employer] (which disclaims liability or warranty for this information). If you have questions about a medical condition or this instruction, always ask your healthcare professional. [place] [address] any warranty or liability for your use of this information. Learning About How to Prevent a Stroke  What is a stroke? A stroke occurs when a blood vessel in the brain bursts or is blocked by a blood clot. Without blood and the oxygen it carries, part of the brain starts to die. The part of the body controlled by the damaged area of the brain can't work properly. But there are many things you can do to help lower your stroke risk. What increases your risk for stroke? A risk factor is anything that makes you more likely to have a particular health problem.   Risk factors for stroke that you can treat or change include:  · Health problems like high blood pressure, atrial fibrillation, diabetes, and high cholesterol. · Smoking. · Heavy use of alcohol. · Being overweight. · Not getting enough physical activity. Risk factors you can't change include:  · Age. The risk of stroke goes up as you get older. · Race.  Americans, Native Americans, and [place] Natives have a higher risk than those of other races. · Being female. Women have a higher risk of stroke than men. · Family history of stroke. Your doctor can help you know your risk. Then you and your can doctor talk about whether you need to lower it. What can you do to prevent a stroke? · Treat any health problems you have that raise your risk. · Adopt a heart-healthy lifestyle:  ? Don't smoke. If you need help quitting, talk to your doctor about stop-smoking programs and medicines. These can increase your chances of quitting for good. ? Limit alcohol to 2 drinks a day for men and 1 drink a day for women. ? Stay at a healthy weight. Lose weight if you need to.  ? If your doctor recommends it, get more exercise. Walking is a good choice. Bit by bit, increase the amount you walk every day. Try for at least 30 minutes on most days of the week. ? Eat heart-healthy foods. These include fruits, vegetables, high-fiber foods, and fish. Choose foods that are low in sodium, saturated fat, and trans fat. · Decide with your doctor whether you will also take medicines to help lower your risk. For example, you and your doctor may decide you will take a medicine that prevents blood clots. What are the symptoms of a stroke? The brain damage from a stroke starts within minutes. That's why it's so important to know the symptoms of stroke and to act fast. Quick treatment can help limit damage to the brain so that you have fewer problems after the stroke. FAST is a simple way to remember the main symptoms of stroke. Recognizing these symptoms helps you know when to call for medical help. FAST stands for:  · Face drooping.   · Arm weakness. · Speech difficulty. · Time to call 911. Follow-up care is a key part of your treatment and safety. Be sure to make and go to all appointments, and call your doctor if you are having problems. It's also a good idea to know your test results and keep a list of the medicines you take. Where can you learn more? Go to http://sima-refugio.info/. Enter G757 in the search box to learn more about \"Learning About How to Prevent a Stroke. \"  Current as of: September 26, 2018  Content Version: 12.1  © 9786-6899 [employer]. Care instructions adapted under license by [employer] (which disclaims liability or warranty for this information). If you have questions about a medical condition or this instruction, always ask your healthcare professional. [address] any warranty or liability for your use of this information. Learning About How to Prevent Another Stroke  What can you do to prevent another stroke? After a stroke, people feel lots of different emotions. Some people are worried that they could have another stroke. Or they may feel overwhelmed by how much there is to learn and do. Some people feel sad or depressed. No matter what emotions you are feeling, you can give yourself some control and peace of mind by making a plan to lower your risk of having another stroke. Take your medicines  You'll need to take medicines to help prevent another stroke. Be sure to take your medicines exactly as prescribed. And don't stop taking them unless your doctor tells you to. If you stop taking your medicines, you can increase your risk of having another stroke. Some of the medicines your doctor may prescribe include:  · Aspirin or some other blood thinner to prevent blood clots. · Statins and other medicines to lower cholesterol. · Blood pressure medicines to lower blood pressure.   Manage other health problems  You can help lower your chance of having another stroke by managing certain other health problems. Problems that increase your risk of having another stroke include:  · High blood pressure. · High cholesterol. · Atrial fibrillation. · Diabetes. If you have any of these health problems, you can manage them with healthy lifestyle changes along with medicine. Adopt a healthy lifestyle  · Do not smoke or allow others to smoke around you. If you need help quitting, talk to your doctor about stop-smoking programs and medicines. These can increase your chances of quitting for good. Smoking makes a stroke more likely. · Limit alcohol to 2 drinks a day for men and 1 drink a day for women. · Lose weight if you need to. Controlling your weight will help you keep your heart and body healthy. · Be active. Ask your doctor what type and level of activity is safe for you. · Eat heart-healthy foods, like fruits, vegetables, and high-fiber foods. It's also important to:  · Get a flu shot every year. · Ask for help if you think you are depressed. Do stroke rehab  Taking part in a stroke rehabilitation (rehab) program will help you to regain skills you lost or make the most of your abilities after a stroke. It also helps you take steps to prevent another stroke. Your rehab team will give you education and support to help you build new, healthy habits. You'll learn how to manage any other health problems that you might have. Josemanuel Ruiz also learn how to exercise safely, eat a healthy diet, and quit smoking if you smoke. Josemanuel Ruiz work with your team to decide what lifestyle choices are best for you. If your doctor hasn't already suggested it, ask him or her if stroke rehab is right for you. Know stroke symptoms  Make sure you know the symptoms of stroke. FAST is a simple way to remember. Recognizing these symptoms helps you know when to call for medical help. FAST stands for:  · Face drooping. · Arm weakness. · Speech difficulty.   · Time to call 911.  Follow-up care is a key part of your treatment and safety. Be sure to make and go to all appointments, and call your doctor if you are having problems. It's also a good idea to know your test results and keep a list of the medicines you take. Where can you learn more? Go to http://sima-refugio.info/. Enter T154 in the search box to learn more about \"Learning About How to Prevent Another Stroke. \"  Current as of: September 26, 2018  Content Version: 12.1  © 6467-1988 [employer]. Care instructions adapted under license by [employer] (which disclaims liability or warranty for this information). If you have questions about a medical condition or this instruction, always ask your healthcare professional. [address] any warranty or liability for your use of this information. Atrial Fibrillation: Care Instructions  Your Care Instructions    Atrial fibrillation is an irregular and often fast heartbeat. Treating this condition is important for several reasons. It can cause blood clots, which can travel from your heart to your brain and cause a stroke. If you have a fast heartbeat, you may feel lightheaded, dizzy, and weak. An irregular heartbeat can also increase your risk for heart failure. Atrial fibrillation is often the result of another heart condition, such as high blood pressure or coronary artery disease. Making changes to improve your heart condition will help you stay healthy and active. Follow-up care is a key part of your treatment and safety. Be sure to make and go to all appointments, and call your doctor if you are having problems. It's also a good idea to know your test results and keep a list of the medicines you take. How can you care for yourself at home? Medicines    · Take your medicines exactly as prescribed. Call your doctor if you think you are having a problem with your medicine.  You will get more details on the specific medicines your doctor prescribes.     · If your doctor has given you a blood thinner to prevent a stroke, be sure you get instructions about how to take your medicine safely. Blood thinners can cause serious bleeding problems.     · Do not take any vitamins, over-the-counter drugs, or herbal products without talking to your doctor first.    Lifestyle changes    · Do not smoke. Smoking can increase your chance of a stroke and heart attack. If you need help quitting, talk to your doctor about stop-smoking programs and medicines. These can increase your chances of quitting for good.     · Eat a heart-healthy diet.     · Stay at a healthy weight. Lose weight if you need to.     · Limit alcohol to 2 drinks a day for men and 1 drink a day for women. Too much alcohol can cause health problems.     · Avoid colds and flu. Get a pneumococcal vaccine shot. If you have had one before, ask your doctor whether you need another dose. Get a flu shot every year. If you must be around people with colds or flu, wash your hands often. Activity    · If your doctor recommends it, get more exercise. Walking is a good choice. Bit by bit, increase the amount you walk every day. Try for at least 30 minutes on most days of the week. You also may want to swim, bike, or do other activities. Your doctor may suggest that you join a cardiac rehabilitation program so that you can have help increasing your physical activity safely.     · Start light exercise if your doctor says it is okay. Even a small amount will help you get stronger, have more energy, and manage stress. Walking is an easy way to get exercise. Start out by walking a little more than you did in the hospital. Gradually increase the amount you walk.     · When you exercise, watch for signs that your heart is working too hard. You are pushing too hard if you cannot talk while you are exercising.  If you become short of breath or dizzy or have chest pain, sit down and rest immediately.     · Check your pulse regularly. Place two fingers on the artery at the palm side of your wrist, in line with your thumb. If your heartbeat seems uneven or fast, talk to your doctor. When should you call for help? Call 911 anytime you think you may need emergency care. For example, call if:    · You have symptoms of a heart attack. These may include:  ? Chest pain or pressure, or a strange feeling in the chest.  ? Sweating. ? Shortness of breath. ? Nausea or vomiting. ? Pain, pressure, or a strange feeling in the back, neck, jaw, or upper belly or in one or both shoulders or arms. ? Lightheadedness or sudden weakness. ? A fast or irregular heartbeat. After you call 911, the  may tell you to chew 1 adult-strength or 2 to 4 low-dose aspirin. Wait for an ambulance. Do not try to drive yourself.     · You have symptoms of a stroke. These may include:  ? Sudden numbness, tingling, weakness, or loss of movement in your face, arm, or leg, especially on only one side of your body. ? Sudden vision changes. ? Sudden trouble speaking. ? Sudden confusion or trouble understanding simple statements. ? Sudden problems with walking or balance. ? A sudden, severe headache that is different from past headaches.     · You passed out (lost consciousness).    Call your doctor now or seek immediate medical care if:    · You have new or increased shortness of breath.     · You feel dizzy or lightheaded, or you feel like you may faint.     · Your heart rate becomes irregular.     · You can feel your heart flutter in your chest or skip heartbeats. Tell your doctor if these symptoms are new or worse.    Watch closely for changes in your health, and be sure to contact your doctor if you have any problems. Where can you learn more? Go to http://sima-refugio.info/. Enter U020 in the search box to learn more about \"Atrial Fibrillation: Care Instructions. \"  Current as of: July 22, 2018  Content Version: 12.1  © 9100-5931 [employer]. Care instructions adapted under license by [employer] (which disclaims liability or warranty for this information). If you have questions about a medical condition or this instruction, always ask your healthcare professional. [address] any warranty or liability for your use of this information.

## 2019-11-27 RX ORDER — APIXABAN 5 MG/1
TABLET, FILM COATED ORAL
Qty: 60 TAB | Refills: 5 | Status: SHIPPED | OUTPATIENT
Start: 2019-11-27 | End: 2020-05-14 | Stop reason: SDUPTHER

## 2020-02-06 RX ORDER — ATORVASTATIN CALCIUM 40 MG/1
40 TABLET, FILM COATED ORAL DAILY
Qty: 90 TAB | Refills: 3 | Status: SHIPPED | OUTPATIENT
Start: 2020-02-06 | End: 2020-05-14 | Stop reason: SDUPTHER

## 2020-05-06 RX ORDER — APIXABAN 5 MG/1
TABLET, FILM COATED ORAL
Qty: 60 TAB | Refills: 4 | OUTPATIENT
Start: 2020-05-06

## 2020-05-11 NOTE — TELEPHONE ENCOUNTER
Message   Received: Today   Message Contents   Rio GrandeRoshan Dawn M, LPN   Caller: Unspecified (6 days ago,  6:11 PM)             Nope, I have reached out to him several times, no response      WILL WAIT FOR PT TO CALL BACK WITH APPT SCHEDULED BEFORE WE REFILL MEDICATION.

## 2020-05-12 NOTE — TELEPHONE ENCOUNTER
Message   Received: Today   Message Contents   Wilmar Rushing Dawn M, LPN   Caller: Unspecified (1 week ago,  6:11 PM)             He is a Dr. Kennedy Lance pt, but he is scheduled w/ Dr MONTES on 5/14          Noted, thanks.

## 2020-05-14 ENCOUNTER — VIRTUAL VISIT (OUTPATIENT)
Dept: CARDIOLOGY CLINIC | Age: 55
End: 2020-05-14

## 2020-05-14 VITALS — HEIGHT: 70 IN | WEIGHT: 185 LBS | BODY MASS INDEX: 26.48 KG/M2

## 2020-05-14 DIAGNOSIS — Z86.73 HISTORY OF CVA (CEREBROVASCULAR ACCIDENT): ICD-10-CM

## 2020-05-14 DIAGNOSIS — I34.0 NONRHEUMATIC MITRAL VALVE REGURGITATION: ICD-10-CM

## 2020-05-14 DIAGNOSIS — Q21.12 PFO (PATENT FORAMEN OVALE): ICD-10-CM

## 2020-05-14 DIAGNOSIS — E78.2 MIXED HYPERLIPIDEMIA: ICD-10-CM

## 2020-05-14 DIAGNOSIS — I48.0 PAROXYSMAL ATRIAL FIBRILLATION (HCC): Primary | ICD-10-CM

## 2020-05-14 RX ORDER — ATORVASTATIN CALCIUM 40 MG/1
40 TABLET, FILM COATED ORAL DAILY
Qty: 90 TAB | Refills: 3 | Status: SHIPPED | OUTPATIENT
Start: 2020-05-14 | End: 2021-06-21 | Stop reason: SDUPTHER

## 2020-05-14 NOTE — PROGRESS NOTES
Cardiology Telemedicine Encounter    Ludwig Cantu is a 48 y.o. male who was seen by synchronous (real-time) audio-video technology on 5/14/2020           Subjective/HPI:     Ludwig Cantu is a 48 y.o. male is here for routine follow-up via virtual visit. He has a PMHx of paroxysmal afib, hyperlipidemia, CVA, MR. He is on eliquis. No cardiac complaints. Last labs more than a year ago. Last echo showed mild to moderate MR. PCP Provider  Rossy Stewart MD    Past Medical History:   Diagnosis Date    Cerebral artery occlusion with cerebral infarction ([facility] [address]     Hyperlipidemia         Allergies   Allergen Reactions    Penicillins Rash        Outpatient Encounter Medications as of 5/14/2020   Medication Sig Dispense Refill    apixaban (Eliquis) 5 mg tablet Take 1 Tab by mouth two (2) times a day. 60 Tab 12    atorvastatin (LIPITOR) 40 mg tablet Take 1 Tab by mouth daily. 90 Tab 3    aspirin delayed-release 81 mg tablet Take  by mouth daily.  multivitamin (ONE A DAY) tablet Take 1 Tab by mouth daily.  glucosam-chond-hyalu-CF borate (MOVE FREE [facility]) 750 mg-100 mg- 1.65 mg-108 mg tab Take  by mouth.  sildenafil citrate (VIAGRA) 25 mg tablet Take 20 mg by mouth as needed.  [DISCONTINUED] atorvastatin (LIPITOR) 40 mg tablet Take 1 Tab by mouth daily. 90 Tab 3    [DISCONTINUED] ELIQUIS 5 mg tablet TAKE ONE TABLET BY MOUTH TWICE A DAY 60 Tab 5     No facility-administered encounter medications on file as of 5/14/2020. Review of Symptoms:    Review of Systems   Constitutional: Negative. Negative for chills and fever. HENT: Negative. Negative for hearing loss. Respiratory: Negative. Negative for cough, hemoptysis and shortness of breath. Cardiovascular: Negative. Negative for chest pain, palpitations, orthopnea, claudication, leg swelling and PND. Gastrointestinal: Negative. Negative for nausea and vomiting.    Musculoskeletal: Negative for myalgias. Skin: Negative. Negative for rash. Neurological: Negative. Negative for dizziness, loss of consciousness and headaches. Physical Exam:      General: Well developed, in no acute distress, cooperative and alert  HEENT: trach is midline. PEERL, EOM intact. Respiratory: No audible wheezing, no acute respiratory distress, normal effort of breathing  Extremities:  No cyanosis, atraumatic. No lower extremity edema. Neuro: A&Ox3, speech clear  Skin: Skin color is normal. No rashes or lesions. Non diaphoretic  Due to this being a TeleHealth evaluation, many elements of the physical examination are unable to be assessed. Cardiology Labs:    No results found for: FLP, CHOL, HDL, LDLC, VLDL, CHHD    No results found for: HBA1C, WFA6KAVK, RLO1UMCR    No results found for: NA, K, CL, CO2, GLU, BUN, CREA, BUCR, GFRAA, GFRNA, CA, AGAP, TBILI, ALT, SGOT, AP, TP, ALB, GLOB, AGRAT       Assessment:       ICD-10-CM ICD-9-CM    1. Paroxysmal atrial fibrillation (HCC) I48.0 427.31 CBC W/O DIFF   2. PFO (patent foramen ovale) Q21.1 745.5    3. History of CVA (cerebrovascular accident) Z86.73 V12.54    4. Nonrheumatic mitral valve regurgitation I34.0 424.0    5. Mixed hyperlipidemia E78.2 272.2 LIPID PANEL      HEPATIC FUNCTION PANEL        Plan:     1. Paroxysmal atrial fibrillation (HCC)  Continue eliquis due to his CVA. Check CBC.  - CBC W/O DIFF    2. PFO (patent foramen ovale)  Not seen on last VENITA. 3. History of CVA (cerebrovascular accident)  Followed by neurology. 4. Nonrheumatic mitral valve regurgitation  Echo next year. 5. Mixed hyperlipidemia  Check lipids. Discussed exercise, diet. He will continue to monitor BP. Medications refilled. F/u in one year. - LIPID PANEL  - HEPATIC FUNCTION PANEL      Maria R Keating MD        This visit was completed using [employer] Virtual Visit telemedicine services    Pursuant to the emergency declaration under the [employer] and the [address] authority and the Coronavirus Preparedness and [employer] Act, this Virtual Visit was conducted, with patient's consent, to reduce the patient's risk of exposure to COVID-19 and provide continuity of care for an established patient. Services were provided through a video synchronous discussion virtually to substitute for in-person clinic visit.

## 2021-05-28 ENCOUNTER — TELEPHONE (OUTPATIENT)
Dept: CARDIOLOGY CLINIC | Age: 56
End: 2021-05-28

## 2021-07-19 ENCOUNTER — OFFICE VISIT (OUTPATIENT)
Dept: CARDIOLOGY CLINIC | Age: 56
End: 2021-07-19
Payer: COMMERCIAL

## 2021-07-19 VITALS
WEIGHT: 233.9 LBS | HEART RATE: 76 BPM | OXYGEN SATURATION: 100 % | DIASTOLIC BLOOD PRESSURE: 80 MMHG | SYSTOLIC BLOOD PRESSURE: 120 MMHG | HEIGHT: 70 IN | BODY MASS INDEX: 33.49 KG/M2 | RESPIRATION RATE: 16 BRPM

## 2021-07-19 DIAGNOSIS — I34.0 NONRHEUMATIC MITRAL VALVE REGURGITATION: ICD-10-CM

## 2021-07-19 DIAGNOSIS — E78.2 MIXED HYPERLIPIDEMIA: ICD-10-CM

## 2021-07-19 DIAGNOSIS — I48.0 PAROXYSMAL ATRIAL FIBRILLATION (HCC): Primary | ICD-10-CM

## 2021-07-19 PROCEDURE — 93000 ELECTROCARDIOGRAM COMPLETE: CPT | Performed by: NURSE PRACTITIONER

## 2021-07-19 PROCEDURE — 99214 OFFICE O/P EST MOD 30 MIN: CPT | Performed by: NURSE PRACTITIONER

## 2021-07-19 RX ORDER — OFLOXACIN 3 MG/ML
1 SOLUTION/ DROPS OPHTHALMIC 4 TIMES DAILY
COMMUNITY
Start: 2021-07-11 | End: 2021-09-02 | Stop reason: ALTCHOICE

## 2021-07-19 RX ORDER — METOPROLOL SUCCINATE 25 MG/1
25 TABLET, EXTENDED RELEASE ORAL DAILY
Qty: 90 TABLET | Refills: 3 | Status: SHIPPED | OUTPATIENT
Start: 2021-07-19 | End: 2021-10-07 | Stop reason: ALTCHOICE

## 2021-07-19 NOTE — PROGRESS NOTES
Elliot Koo, [facility]    Subjective/HPI:     Sugar Zimmerman is a 50 y.o. male is here for routine f/u. He has a PMHx of PAF, CVA, mitral regurgitation and HLD. Doing well. Still exercises daily. Denies symptoms with exercise, but does feel like his heart rate with exercise is higher than usual.  Denies complaints of chest pains, dizziness, orthopnea, PND or edema. Denies dizziness or shortness of breath with exertion. Stays busy with work --- he's working on a presentation during our visit today. Has been out of statin as neurology as not filled it -- he is overdue with appt with them. Current Outpatient Medications on File Prior to Visit   Medication Sig Dispense Refill    ofloxacin (FLOXIN) 0.3 % ophthalmic solution Administer 1 Drop to left eye four (4) times daily.  apixaban (Eliquis) 5 mg tablet Take 1 Tablet by mouth two (2) times a day. 60 Tablet 0    aspirin delayed-release 81 mg tablet Take  by mouth daily.  multivitamin (ONE A DAY) tablet Take 1 Tab by mouth daily.  glucosam-chond-hyalu-CF borate ([facility]) 750 mg-100 mg- 1.65 mg-108 mg tab Take  by mouth.  sildenafil citrate (VIAGRA) 25 mg tablet Take 20 mg by mouth as needed.  atorvastatin (LIPITOR) 40 mg tablet Take 1 Tablet by mouth daily. (Patient not taking: Reported on 7/19/2021) 60 Tablet 0     No current facility-administered medications on file prior to visit. Review of Symptoms:    Review of Systems   Constitutional: Negative for chills, fever and weight loss. HENT: Negative for nosebleeds. Eyes: Negative for blurred vision and double vision. Respiratory: Negative for cough, shortness of breath and wheezing. Cardiovascular: Negative for chest pain, palpitations, orthopnea, leg swelling and PND. Skin: Negative for rash. Neurological: Negative for dizziness and loss of consciousness.        Physical Exam:      General: Well developed, in no acute distress, cooperative and alert  Heart:  reg rate and rhythm; normal S1/S2; no murmurs, no gallops or rubs. Respiratory: Clear bilaterally x 4, no wheezing or rales  Extremities:  Normal cap refill, no cyanosis, atraumatic. No edema. Vascular: 2+ pulses symmetric in all extremities    Vitals:    07/19/21 1343   BP: 120/80  Comment: lt/rt/upperarm   BP Patient Position: Sitting   BP Cuff Size: Large adult   Pulse: 76  Comment: irregular   Resp: 16   Height: 5' 10\" (1.778 m)   Weight: 233 lb 14.4 oz (106.1 kg)   SpO2: 100%       ECG done today shows atrial fibrillation     Assessment:       ICD-10-CM ICD-9-CM    1. Paroxysmal atrial fibrillation (HCC)  I48.0 427.31 AMB POC EKG ROUTINE W/ 12 LEADS, INTER & REP      CBC W/O DIFF      METABOLIC PANEL, COMPREHENSIVE      MAGNESIUM   2. Nonrheumatic mitral valve regurgitation  I34.0 424.0 AMB POC EKG ROUTINE W/ 12 LEADS, INTER & REP   3. Mixed hyperlipidemia  E78.2 272.2 AMB POC EKG ROUTINE W/ 12 LEADS, INTER & REP        Plan:     1. Paroxysmal atrial fibrillation (HCC)  In A fib today -- asymptomatic  Start Toprol 25 mg daily  Plan for VENITA/DCCV, may consider starting AAD  Continue Eliquis 5 mg BID for stroke prevention  CPT 75381, [id number]    2. Nonrheumatic mitral valve regurgitation  VENITA done 3/2019 with preserved LVEF 55-60% with mild-mod MR, mild AI  Check MR with VENITA  BP does not support adding ACEi    3.  Mixed hyperlipidemia  Check lipids  Continue statin therapy, low fat, low cholesterol diet    F/u with Dr. Maynrad Officer after cardioversion    Doug Ramirez NP

## 2021-07-19 NOTE — PROGRESS NOTES
Chief Complaint   Patient presents with    Follow-up    Valvular Heart Disease    Irregular Heart Beat    Cholesterol Problem     1. Have you been to the ER, urgent care clinic since your last visit? No Hospitalized since your last visit? No    2. Have you seen or consulted any other health care providers outside of the [address] [place] [address] since your last visit? PCP  , Eye exam & Dentist .Include any pap smears or colon screening. NO    Has been working out of town a lot food and exercising has been an issue.

## 2021-07-27 ENCOUNTER — TELEPHONE (OUTPATIENT)
Dept: CARDIOLOGY CLINIC | Age: 56
End: 2021-07-27

## 2021-07-27 LAB
ALBUMIN SERPL-MCNC: 4.3 G/DL (ref 3.8–4.9)
ALBUMIN/GLOB SERPL: 1.6 {RATIO} (ref 1.2–2.2)
ALP SERPL-CCNC: 70 IU/L (ref 48–121)
ALT SERPL-CCNC: 58 IU/L (ref 0–44)
AST SERPL-CCNC: 28 IU/L (ref 0–40)
BILIRUB SERPL-MCNC: 0.4 MG/DL (ref 0–1.2)
BUN SERPL-MCNC: 20 MG/DL (ref 6–24)
BUN/CREAT SERPL: 19 (ref 9–20)
CALCIUM SERPL-MCNC: 9.5 MG/DL (ref 8.7–10.2)
CHLORIDE SERPL-SCNC: 103 MMOL/L (ref 96–106)
CHOLEST SERPL-MCNC: 242 MG/DL (ref 100–199)
CO2 SERPL-SCNC: 26 MMOL/L (ref 20–29)
CREAT SERPL-MCNC: 1.08 MG/DL (ref 0.76–1.27)
ERYTHROCYTE [DISTWIDTH] IN BLOOD BY AUTOMATED COUNT: 12.3 % (ref 11.6–15.4)
GLOBULIN SER CALC-MCNC: 2.7 G/DL (ref 1.5–4.5)
GLUCOSE SERPL-MCNC: 119 MG/DL (ref 65–99)
HCT VFR BLD AUTO: 49.1 % (ref 37.5–51)
HDLC SERPL-MCNC: 55 MG/DL
HGB BLD-MCNC: 16.1 G/DL (ref 13–17.7)
IMP & REVIEW OF LAB RESULTS: NORMAL
LDLC SERPL CALC-MCNC: 157 MG/DL (ref 0–99)
MAGNESIUM SERPL-MCNC: 2 MG/DL (ref 1.6–2.3)
MCH RBC QN AUTO: 30.5 PG (ref 26.6–33)
MCHC RBC AUTO-ENTMCNC: 32.8 G/DL (ref 31.5–35.7)
MCV RBC AUTO: 93 FL (ref 79–97)
PLATELET # BLD AUTO: 265 X10E3/UL (ref 150–450)
POTASSIUM SERPL-SCNC: 4.6 MMOL/L (ref 3.5–5.2)
PROT SERPL-MCNC: 7 G/DL (ref 6–8.5)
RBC # BLD AUTO: 5.28 X10E6/UL (ref 4.14–5.8)
SODIUM SERPL-SCNC: 140 MMOL/L (ref 134–144)
SPECIMEN STATUS REPORT, ROLRST: NORMAL
TRIGL SERPL-MCNC: 168 MG/DL (ref 0–149)
VLDLC SERPL CALC-MCNC: 30 MG/DL (ref 5–40)
WBC # BLD AUTO: 7.4 X10E3/UL (ref 3.4–10.8)

## 2021-07-27 NOTE — TELEPHONE ENCOUNTER
----- Message from Brendan Weinstein NP sent at 7/27/2021  9:11 AM EDT -----  Nadege Zamora,    Please call patient and inform labs show stable kidney function, potassium and magnesium. Lipids are high! He has been out of statin though, so please make sure he gets a refill with neurology and takes this daily. Keep appt with us after cardioversion.     Thanks,  [employer]

## 2021-07-27 NOTE — PROGRESS NOTES
[place],    Please call patient and inform labs show stable kidney function, potassium and magnesium. Lipids are high! He has been out of statin though, so please make sure he gets a refill with neurology and takes this daily. Keep appt with us after cardioversion.     Thanks,  [employer] [place]

## 2021-07-28 ENCOUNTER — TELEPHONE (OUTPATIENT)
Dept: CARDIOLOGY CLINIC | Age: 56
End: 2021-07-28

## 2021-07-28 NOTE — TELEPHONE ENCOUNTER
Spoke with patients wife, on HPI  Verified with two patient identifiers. Advised pts wife per Krys Bledsoe NP,  labs show stable kidney function, potassium and magnesium. Lipids are high! He has been out of statin though, so please make sure he gets a refill with neurology and takes this daily. Keep appt with us after cardioversion. Verified pts f/u date and appt time with Dr. Sarah Garcia with pts wife. Patients wife  verbalized understanding.

## 2021-07-30 ENCOUNTER — HOSPITAL ENCOUNTER (OUTPATIENT)
Dept: NON INVASIVE DIAGNOSTICS | Age: 56
Discharge: HOME | End: 2021-07-30
Attending: INTERNAL MEDICINE
Payer: COMMERCIAL

## 2021-07-30 VITALS
WEIGHT: 220 LBS | HEIGHT: 70 IN | HEART RATE: 72 BPM | OXYGEN SATURATION: 96 % | DIASTOLIC BLOOD PRESSURE: 82 MMHG | RESPIRATION RATE: 19 BRPM | SYSTOLIC BLOOD PRESSURE: 112 MMHG | BODY MASS INDEX: 31.5 KG/M2

## 2021-07-30 DIAGNOSIS — I48.0 PAROXYSMAL ATRIAL FIBRILLATION (HCC): ICD-10-CM

## 2021-07-30 DIAGNOSIS — I48.91 ATRIAL FIBRILLATION, UNSPECIFIED TYPE (HCC): ICD-10-CM

## 2021-07-30 LAB
ATRIAL RATE: 73 BPM
CALCULATED P AXIS, ECG09: 66 DEGREES
CALCULATED R AXIS, ECG10: -30 DEGREES
CALCULATED T AXIS, ECG11: 27 DEGREES
DIAGNOSIS, 93000: NORMAL
P-R INTERVAL, ECG05: 162 MS
Q-T INTERVAL, ECG07: 420 MS
QRS DURATION, ECG06: 96 MS
QTC CALCULATION (BEZET), ECG08: 462 MS
VENTRICULAR RATE, ECG03: 73 BPM

## 2021-07-30 PROCEDURE — 99152 MOD SED SAME PHYS/QHP 5/>YRS: CPT

## 2021-07-30 PROCEDURE — 93325 DOPPLER ECHO COLOR FLOW MAPG: CPT | Performed by: INTERNAL MEDICINE

## 2021-07-30 PROCEDURE — 93312 ECHO TRANSESOPHAGEAL: CPT

## 2021-07-30 PROCEDURE — 93312 ECHO TRANSESOPHAGEAL: CPT | Performed by: INTERNAL MEDICINE

## 2021-07-30 PROCEDURE — 93320 DOPPLER ECHO COMPLETE: CPT | Performed by: INTERNAL MEDICINE

## 2021-07-30 PROCEDURE — 93005 ELECTROCARDIOGRAM TRACING: CPT

## 2021-07-30 PROCEDURE — 74011250636 HC RX REV CODE- 250/636: Performed by: INTERNAL MEDICINE

## 2021-07-30 PROCEDURE — 92960 CARDIOVERSION ELECTRIC EXT: CPT | Performed by: INTERNAL MEDICINE

## 2021-07-30 PROCEDURE — 99153 MOD SED SAME PHYS/QHP EA: CPT

## 2021-07-30 PROCEDURE — 77030018729 HC ELECTRD DEFIB PAD CARD -B

## 2021-07-30 PROCEDURE — 74011000250 HC RX REV CODE- 250: Performed by: INTERNAL MEDICINE

## 2021-07-30 RX ORDER — FENTANYL CITRATE 50 UG/ML
12.5-5 INJECTION, SOLUTION INTRAMUSCULAR; INTRAVENOUS
Status: DISCONTINUED | OUTPATIENT
Start: 2021-07-30 | End: 2021-07-30

## 2021-07-30 RX ORDER — OMEPRAZOLE 20 MG/1
20 CAPSULE, DELAYED RELEASE ORAL DAILY
COMMUNITY

## 2021-07-30 RX ORDER — PROPAFENONE HYDROCHLORIDE 150 MG/1
150 TABLET, FILM COATED ORAL EVERY 8 HOURS
Qty: 90 TABLET | Refills: 3 | Status: SHIPPED
Start: 2021-07-30 | End: 2021-09-20

## 2021-07-30 RX ORDER — MIDAZOLAM HYDROCHLORIDE 1 MG/ML
.5-2 INJECTION, SOLUTION INTRAMUSCULAR; INTRAVENOUS
Status: DISCONTINUED | OUTPATIENT
Start: 2021-07-30 | End: 2021-07-30

## 2021-07-30 RX ORDER — LIDOCAINE HYDROCHLORIDE 20 MG/ML
15 SOLUTION OROPHARYNGEAL AS NEEDED
Status: DISCONTINUED | OUTPATIENT
Start: 2021-07-30 | End: 2021-07-30

## 2021-07-30 RX ORDER — ATORVASTATIN CALCIUM 40 MG/1
40 TABLET, FILM COATED ORAL DAILY
Qty: 90 TABLET | Refills: 3 | Status: SHIPPED | OUTPATIENT
Start: 2021-07-30

## 2021-07-30 RX ADMIN — MIDAZOLAM HYDROCHLORIDE 1 MG: 1 INJECTION, SOLUTION INTRAMUSCULAR; INTRAVENOUS at 09:46

## 2021-07-30 RX ADMIN — MIDAZOLAM HYDROCHLORIDE 1 MG: 1 INJECTION, SOLUTION INTRAMUSCULAR; INTRAVENOUS at 09:40

## 2021-07-30 RX ADMIN — FENTANYL CITRATE 25 MCG: 50 INJECTION, SOLUTION INTRAMUSCULAR; INTRAVENOUS at 09:51

## 2021-07-30 RX ADMIN — FENTANYL CITRATE 25 MCG: 50 INJECTION, SOLUTION INTRAMUSCULAR; INTRAVENOUS at 09:40

## 2021-07-30 RX ADMIN — LIDOCAINE HYDROCHLORIDE 15 ML: 20 SOLUTION ORAL at 09:24

## 2021-07-30 RX ADMIN — MIDAZOLAM HYDROCHLORIDE 1 MG: 1 INJECTION, SOLUTION INTRAMUSCULAR; INTRAVENOUS at 09:30

## 2021-07-30 RX ADMIN — MIDAZOLAM HYDROCHLORIDE 1 MG: 1 INJECTION, SOLUTION INTRAMUSCULAR; INTRAVENOUS at 09:51

## 2021-07-30 RX ADMIN — BENZOCAINE, BUTAMBEN, AND TETRACAINE HYDROCHLORIDE 1 SPRAY: .028; .004; .004 AEROSOL, SPRAY TOPICAL at 09:25

## 2021-07-30 RX ADMIN — MIDAZOLAM HYDROCHLORIDE 1 MG: 1 INJECTION, SOLUTION INTRAMUSCULAR; INTRAVENOUS at 09:52

## 2021-07-30 RX ADMIN — FENTANYL CITRATE 25 MCG: 50 INJECTION, SOLUTION INTRAMUSCULAR; INTRAVENOUS at 09:46

## 2021-07-30 RX ADMIN — FENTANYL CITRATE 25 MCG: 50 INJECTION, SOLUTION INTRAMUSCULAR; INTRAVENOUS at 09:30

## 2021-07-30 NOTE — DISCHARGE INSTRUCTIONS
DISCHARGE SUMMARY       The following personal items collected during your admission are returned to you:   Jewelry:  With patient  Clothing: shirt, shoes and mask          PATIENT INSTRUCTIONS: Continue taking all the same medications as previously prescribed. START taking Rythmol (Propafenone) as directed. This prescription has been called into your pharmacy for pick-up. You had a transesophageal echocardiogram, your throat was numbed for the procedure, so start with sips of water and advance diet as swallowing returns to normal. Avoid any scalding hot beverages for the next 2 hours. The cardioversion procedure can cause redness to the skin where the patches were placed. You may treat this with Aloe or Cortisone cream over the counter lotion. If the skin appears very reddened or blistered contact your physician      You have a follow-up appointment scheduled with Dr. Anali Dash for Aug. 27, 2021. What to do at Home:  Recommended activity: No driving today. No operating heavy machinery. The discharge information has been reviewed with the PATIENT/family . The PATIENT/family  verbalized understanding. [address] [place] [address]  [phone]      VENITA and Cardioversion Discharge Instructions    Patient ID:  Ximena Srivastava  [id number]  73 y.o.  1965    Date: 7/30/2021    Attending Physician: No admitting provider for patient encounter. Admission Diagnoses:   Atrial fibrillation, unspecified type [facility]) [I48.91]    Discharge Diagnoses: Active Problems:    * No active hospital problems. *      Discharge Condition: Good    Cardiology Procedures this Admission:  VENITA/Cardioversion    Disposition: home    Reference discharge instructions provided by nursing for diet and activity.         Signed:  Cash Burks MD  7/30/2021  10:23 AM      VENITA and Cardioversion: After Your Visit     Your Care Instructions    After your VENITA and cardioversion, do not eat or drink for 2 hours. You have received a mild anesthesia and numbing medicine in your throat. After these 2 hours, start with sips of water. If you are able to swallow water easily, try something soft (bananna, applesauce). If your throat feels normal, you may resume your regular diet. Do not drive for the rest of the day or as instructed by the physician. Notify your physician if you have any problems after the test: swelling of your tongue or throat, shortness of breath or coughing up blood. Cardioversion is a treatment for an abnormal heartbeat, such as atrial fibrillation. In cardioversion, a brief electric shock is given to the heart to reset its rhythm. The shock comes through metal paddles or patches placed on the outside of your chest. Cardioversion usually restores the heartbeat to normal.    After the procedure, you may have redness, like a sunburn, where the paddles were. Do not drive until the day after a cardioversion. You can eat and drink when you feel ready to. Your doctor may have you take medicines daily to help the heart beat normally and to prevent blood clots. Your doctor may prescribe medicine before and after cardioversion to help keep your heart in a normal rhythm after the procedure. Instead of electric cardioversion, your doctor may try to convert your heart to a normal rhythm using medicine given through a vein. This is usually done in a clinic or hospital.    Follow-up care is a key part of your treatment and safety. Be sure to make and go to all appointments, and call your doctor if you are having problems. Its also a good idea to know your test results and keep a list of the medicines you take. How can you care for yourself at home? Medicines  Take your medicines exactly as prescribed. Call your doctor if you think you are having a problem with your medicine.  You may take some of the following medicines:  Beta-blockers such as propranolol (Inderal), metoprolol (Lopressor), or carvedilol (Coreg). Calcium channel blockers such as diltiazem (Cardizem) or verapamil (Calan). Digoxin (Lanoxin). Antiarrhythmic medicines such as amiodarone (Cordarone), procainamide, or flecainide (Tambocor). You will get more details on the specific medicines your doctor prescribes. If your doctor has given you blood thinners such as warfarin (Coumadin), enoxaparin (Lovenox), or aspirin to prevent blood clots, be sure to: Take your medicine at the same time each day. Talk with your doctor before you make any major changes to your diet or start a weight loss plan. The foods that you eat can affect how well blood thinners work for you. Tell your dentist, pharmacist, and other health professionals that you take blood thinners. Watch for unusual bruising or bleeding, such as blood in urine, maroon or black stools, or bleeding from the nose or gums. Get regular blood tests to check how fast your blood clots, if you are taking Coumadin. Wear medical alert jewelry that says you take blood thinners. You can buy this at most [employer]. Do not take any vitamins, over-the-counter medicines, or herbal products without talking to your doctor first.    Lifestyle changes  Do not smoke. Smoking increases your risk of stroke and heart attack. If you need help quitting, talk to your doctor about stop-smoking programs and medicines. These can increase your chances of quitting for good. Limit alcohol to 2 drinks a day for men and 1 drink a day for women. Alcohol may interfere with blood thinners. It also increases your risk of falls, which can cause bruising and bleeding. Limit or avoid caffeine (coffee, tea, and cola drinks) and other stimulants (decongestants, over-the-counter cold and flu medicines, and illegal substances) if your doctor says to. They can trigger heart problems. If you are taking blood thinners, avoid sports or activities that could lead to injury.  Make your home safe and take other measures to reduce your risk of falling. Always wear a seat belt while in a car. Activity  If your doctor recommends it, get more exercise. Walking is a good choice. Bit by bit, increase the amount you walk every day. Try for 30 minutes on most days of the week. You also may want to swim, bike, or do other activities. When you exercise, watch for signs that your heart is working too hard. You are pushing too hard if you cannot talk while you are exercising. If you become short of breath or dizzy or have chest pain, sit down and rest immediately. If your doctor has not set you up with a cardiac rehabilitation program, talk to him or her about whether that is right for you. Cardiac rehab includes supervised exercise, help with diet and lifestyle changes, and emotional support. It may reduce your risk of future heart problems. Check your pulse regularly. Place two fingers on the artery at the palm side of your wrist in line with your thumb. If your heartbeat seems uneven or fast, talk to your doctor. When should you call for help? Call 911 anytime you think you may need emergency care. For example, call if:  You have severe trouble breathing. You cough up pink, foamy mucus and you have trouble breathing. You have symptoms of a heart attack such as:  Chest pain or pressure. Sweating. Shortness of breath. Nausea or vomiting. Pain that spreads from the chest to the neck, jaw, or one or both shoulders or arms. Dizziness or lightheadedness. A fast or uneven pulse. After calling 911, chew 1 adult-strength aspirin. Wait for an ambulance. Do not try to drive yourself. You have signs of a stroke. These may include:  Sudden numbness, paralysis, or weakness in your face, arm, or leg, especially on only one side of your body. New problems with walking or balance. Sudden vision changes. Drooling or slurred speech. New problems speaking or understanding simple statements, or feeling confused.   A sudden, severe headache that is different from past headaches. You cough up blood. You vomit blood or what looks like coffee grounds. You pass maroon or very bloody stools. You passed out (lost consciousness). Call your doctor now or seek immediate medical care if:  You have new or increased shortness of breath. You are dizzy or lightheaded, or you feel like you may faint. You have sudden weight gain, such as 3 pounds or more in 2 to 3 days. You have increased swelling in your legs, ankles, or feet. You have new bruises or blood spots under your skin. You have a nosebleed. Your gums bleed when you brush your teeth. You have blood in your urine. Your stools are black and tarlike or have streaks of blood. You have vaginal bleeding when you are not having your period, or heavy period bleeding. Watch closely for changes in your health, and be sure to contact your doctor if you have any problems. Where can you learn more? Go to [employer]. Enter P066 in the search box to learn more about \"Cardioversion: After Your Visit. \"    © 4943-4905 [employer], [employer]. Care instructions adapted under license by [employer] (which disclaims liability or warranty for this information). This care instruction is for use with your licensed healthcare professional. If you have questions about a medical condition or this instruction, always ask your healthcare professional. Inge Machuca any warranty or liability for your use of this information.

## 2021-07-30 NOTE — PROGRESS NOTES
Pt sedated with 4mg Versed and 75mcg Fentanyl for VENITA (monitored sedation from 0930 to 0959)    Pt sedated with an additional 1mg Versed and 25mcg Fentanyl, given 1 synchronized shock(s) at 360 Joules, afib converted to NSR.(monitored sedation from 0930 to 0959)

## 2021-07-30 NOTE — PROGRESS NOTES
Patient arrived to Non-Invasive Cardiology Lab for Out Patient VENITA/DCC Procedure. Staff introduced to patient. Patient identifiers verified with Name and Date of Birth. Procedure verified with patient. Consent forms reviewed and signed by patient or authorized representative and verified. Allergies verified. Patient informed of procedure and plan of care. Questions answered with review. Patient on cardiac monitor, non-invasive blood pressure, SPO2 monitor. On RA. Patient is A&Ox3. Patient reports no complaints. Patient on stretcher, in low position, with side rails up. Patient instructed to call for assistance as needed. Family in waiting room.  Wife Anuradha Hernandez [phone]

## 2021-08-27 ENCOUNTER — OFFICE VISIT (OUTPATIENT)
Dept: CARDIOLOGY CLINIC | Age: 56
End: 2021-08-27
Payer: COMMERCIAL

## 2021-08-27 VITALS
BODY MASS INDEX: 33.5 KG/M2 | RESPIRATION RATE: 18 BRPM | DIASTOLIC BLOOD PRESSURE: 84 MMHG | SYSTOLIC BLOOD PRESSURE: 122 MMHG | OXYGEN SATURATION: 98 % | HEIGHT: 70 IN | HEART RATE: 71 BPM | WEIGHT: 234 LBS

## 2021-08-27 DIAGNOSIS — I34.0 NONRHEUMATIC MITRAL VALVE REGURGITATION: ICD-10-CM

## 2021-08-27 DIAGNOSIS — I48.3 TYPICAL ATRIAL FLUTTER (HCC): ICD-10-CM

## 2021-08-27 DIAGNOSIS — E78.2 MIXED HYPERLIPIDEMIA: ICD-10-CM

## 2021-08-27 DIAGNOSIS — I48.0 PAROXYSMAL ATRIAL FIBRILLATION (HCC): Primary | ICD-10-CM

## 2021-08-27 PROCEDURE — 99213 OFFICE O/P EST LOW 20 MIN: CPT | Performed by: INTERNAL MEDICINE

## 2021-08-27 PROCEDURE — 93000 ELECTROCARDIOGRAM COMPLETE: CPT | Performed by: INTERNAL MEDICINE

## 2021-08-27 NOTE — LETTER
8/27/2021    Patient: Randy Webb   YOB: 1965   Date of Visit: 8/27/2021     Chery Joens, [address] [facility]  [address] [address]  Via Fax: [phone]    Dear Chery Jones MD,      Thank you for referring Mr. Clark Le to [facility]note 3] [facility]on for evaluation. My notes for this consultation are attached. If you have questions, please do not hesitate to call me. I look forward to following your patient along with you.       Sincerely,    Aubrie Penn MD

## 2021-08-27 NOTE — PROGRESS NOTES
Subjective/HPI:     Sanju Bailey is a 50 y.o. male is here for hospital f/u. He has a PMHx of PAF, CVA, mitral regurgitation and HLD. He had VENITA/DCCV done 7/30/21. Started on propafenone at discharge. Feels tanner, more energy since [facility]. Current Outpatient Medications on File Prior to Visit   Medication Sig Dispense Refill    omeprazole (PRILOSEC) 20 mg capsule Take 20 mg by mouth daily.  atorvastatin (LIPITOR) 40 mg tablet Take 1 Tablet by mouth daily. 90 Tablet 3    propafenone (RYTHMOL) 150 mg tablet Take 1 Tablet by mouth every eight (8) hours. 90 Tablet 3    ofloxacin (FLOXIN) 0.3 % ophthalmic solution Administer 1 Drop to left eye four (4) times daily.  metoprolol succinate (TOPROL-XL) 25 mg XL tablet Take 1 Tablet by mouth daily. 90 Tablet 3    apixaban (Eliquis) 5 mg tablet Take 1 Tablet by mouth two (2) times a day. 180 Tablet 3    aspirin delayed-release 81 mg tablet Take  by mouth daily.  multivitamin (ONE A DAY) tablet Take 1 Tab by mouth daily.  glucosam-chond-hyalu-CF borate ([facility]) 750 mg-100 mg- 1.65 mg-108 mg tab Take  by mouth.  sildenafil citrate (VIAGRA) 25 mg tablet Take 20 mg by mouth as needed. No current facility-administered medications on file prior to visit. Review of Symptoms:    Review of Systems   Constitutional: Negative. Negative for chills, fever and weight loss. HENT: Negative. Negative for hearing loss and nosebleeds. Eyes: Negative for blurred vision and double vision. Respiratory: Negative. Negative for cough, hemoptysis, shortness of breath and wheezing. Cardiovascular: Negative. Negative for chest pain, palpitations, orthopnea, claudication, leg swelling and PND. Gastrointestinal: Negative. Negative for nausea and vomiting. Musculoskeletal: Negative for myalgias. Skin: Negative. Negative for rash. Neurological: Negative.   Negative for dizziness, loss of consciousness and headaches. Physical Exam:      General: Well developed, in no acute distress, cooperative and alert  Heart:  reg rate and rhythm; normal S1/S2; no murmurs, no gallops or rubs. Respiratory: Clear bilaterally x 4, no wheezing or rales  Extremities:  Normal cap refill, no cyanosis, atraumatic. No edema. Vascular: 2+ pulses symmetric in all extremities    Vitals:    08/27/21 0958   BP: 122/84   BP 1 Location: Left arm   BP Patient Position: Sitting   BP Cuff Size: Small adult   Pulse: 71   Resp: 18   Height: 5' 10\" (1.778 m)   Weight: 234 lb (106.1 kg)   SpO2: 98%       ECG done today shows atrial flutter with 4:1 conduction. Assessment:       ICD-10-CM ICD-9-CM    1. Paroxysmal atrial fibrillation (HCC)  I48.0 427.31 AMB POC EKG ROUTINE W/ 12 LEADS, INTER & REP   2. Nonrheumatic mitral valve regurgitation  I34.0 424.0    3. Mixed hyperlipidemia  E78.2 272.2    4. Typical atrial flutter (HCC)  I48.3 427.32         Plan:     1. Paroxysmal atrial fibrillation (HCC)  S/p VENITA/DCCV on 7/30/21  Now on propafenone, Toprol  Continue Eliquis for stroke prevention     2. Nonrheumatic mitral valve regurgitation  VENITA done 7/2021 with mild MR, preserved LVEF 55-60%  Continue present management; BP does not allow for afterload reduction     3. Mixed hyperlipidemia   in 7/2021  Had been off statin therapy -- discussed importance of lipid control given hx of CVA, AF    4. Atrial Flutter- discussed ablation. Will refer to EP. Continue current therapy for rate control until seen by EP.        Trell Fitzgerald MD

## 2021-08-27 NOTE — PROGRESS NOTES
Chief Complaint   Patient presents with    Irregular Heart Beat     no cardiac issues    Follow-up     2-3 wk     1. Have you been to the ER, urgent care clinic since your last visit? Hospitalized since your last visit? No    2. Have you seen or consulted any other health care providers outside of the [address] [place] [address] since your last visit? Include any pap smears or colon screening.  Yes PCP

## 2021-09-02 ENCOUNTER — TELEPHONE (OUTPATIENT)
Dept: CARDIOLOGY CLINIC | Age: 56
End: 2021-09-02

## 2021-09-02 ENCOUNTER — OFFICE VISIT (OUTPATIENT)
Dept: CARDIOLOGY CLINIC | Age: 56
End: 2021-09-02
Payer: COMMERCIAL

## 2021-09-02 VITALS
HEIGHT: 70 IN | WEIGHT: 235.5 LBS | OXYGEN SATURATION: 98 % | BODY MASS INDEX: 33.71 KG/M2 | RESPIRATION RATE: 18 BRPM | DIASTOLIC BLOOD PRESSURE: 86 MMHG | HEART RATE: 73 BPM | SYSTOLIC BLOOD PRESSURE: 118 MMHG

## 2021-09-02 DIAGNOSIS — E78.2 MIXED HYPERLIPIDEMIA: ICD-10-CM

## 2021-09-02 DIAGNOSIS — I48.0 PAROXYSMAL ATRIAL FIBRILLATION (HCC): Primary | ICD-10-CM

## 2021-09-02 DIAGNOSIS — I48.3 TYPICAL ATRIAL FLUTTER (HCC): ICD-10-CM

## 2021-09-02 DIAGNOSIS — I34.0 NONRHEUMATIC MITRAL VALVE REGURGITATION: ICD-10-CM

## 2021-09-02 PROCEDURE — 99214 OFFICE O/P EST MOD 30 MIN: CPT | Performed by: INTERNAL MEDICINE

## 2021-09-02 NOTE — H&P (VIEW-ONLY)
Subjective:      Ines Denver is a 50 y.o. male is here for EP consult. Pt with hx of cva, then presented with AF in July was stefanie/cardioverted and started on propafenone/bb. Now presents with AFL with 3:1 conduction. C/o fatigue and difficulty sleeping      Patient Active Problem List    Diagnosis Date Noted    Paroxysmal atrial fibrillation ([address] 07/19/2021    Nonrheumatic mitral valve regurgitation 07/19/2021    Mixed hyperlipidemia 07/19/2021    History of CVA (cerebrovascular accident) 04/04/2019      Suma Lombardi MD  Past Medical History:   Diagnosis Date    Arrhythmia     A fib    Atrial fibrillation ([address]     Cerebral artery occlusion with cerebral infarction [facility])     Jan. 2019    GERD (gastroesophageal reflux disease)     Hyperlipidemia     Valvular heart disease       Past Surgical History:   Procedure Laterality Date    HX HEENT      Tonsillectomy as a child     Allergies   Allergen Reactions    Penicillins Rash      Family History   Problem Relation Age of Onset   Ewelina Blank Stroke Mother     Heart Disease Mother     Diabetes Father     Diabetes Sister     Diabetes Brother     negative for cardiac disease  Social History     Socioeconomic History    Marital status:      Spouse name: Not on file    Number of children: Not on file    Years of education: Not on file    Highest education level: Not on file   Tobacco Use    Smoking status: Never Smoker    Smokeless tobacco: Never Used   Vaping Use    Vaping Use: Never used   Substance and Sexual Activity    Alcohol use:  Yes     Alcohol/week: 7.0 standard drinks     Types: 3 Glasses of wine, 3 Cans of beer, 1 Shots of liquor per week     Comment: per week    Drug use: No     Social Determinants of Health     Financial Resource Strain:     Difficulty of Paying Living Expenses:    Food Insecurity:     Worried About Running Out of Food in the Last Year:     Ran Out of Food in the Last Year:    Transportation Needs:     Lack of Transportation (Medical):  Lack of Transportation (Non-Medical):    Physical Activity:     Days of Exercise per Week:     Minutes of Exercise per Session:    Stress:     Feeling of Stress :    Social Connections:     Frequency of Communication with Friends and Family:     Frequency of Social Gatherings with Friends and Family:     Attends [employer]ology Services:     Active Member of Clubs or Organizations:     Attends Club or Organization Meetings:     Marital Status:      Current Outpatient Medications   Medication Sig    omeprazole (PRILOSEC) 20 mg capsule Take 20 mg by mouth daily.  atorvastatin (LIPITOR) 40 mg tablet Take 1 Tablet by mouth daily.  propafenone (RYTHMOL) 150 mg tablet Take 1 Tablet by mouth every eight (8) hours.  metoprolol succinate (TOPROL-XL) 25 mg XL tablet Take 1 Tablet by mouth daily.  apixaban (Eliquis) 5 mg tablet Take 1 Tablet by mouth two (2) times a day.  aspirin delayed-release 81 mg tablet Take  by mouth daily.  multivitamin (ONE A DAY) tablet Take 1 Tab by mouth daily.  glucosam-chond-hyalu-CF borate ([facility]) 750 mg-100 mg- 1.65 mg-108 mg tab Take  by mouth.  sildenafil citrate (VIAGRA) 25 mg tablet Take 20 mg by mouth as needed. No current facility-administered medications for this visit. Vitals:    09/02/21 0930   BP: 118/86   Pulse: 73   Resp: 18   SpO2: 98%   Weight: 235 lb 8 oz (106.8 kg)   Height: 5' 10\" (1.778 m)       I have reviewed the nurses notes, vitals, problem list, allergy list, medical history, family, social history and medications. Review of Symptoms:    General: Pt denies excessive weight gain or loss. Pt is able to conduct ADL's, +fatigue  HEENT: Denies blurred vision, headaches, hearing loss, epistaxis and difficulty swallowing. Respiratory: Denies cough, congestion, shortness of breath, PINK, wheezing or stridor.   Cardiovascular: Denies precordial pain, palpitations, edema or PND  Gastrointestinal: Denies poor appetite, indigestion, abdominal pain or blood in stool  Genitourinary: Denies hematuria, dysuria, increased urinary frequency  Musculoskeletal: Denies joint pain or swelling from muscles or joints  Neurologic: Denies tremor, paresthesias, headache, or sensory motor disturbance  Psychiatric: Denies confusion, insomnia, depression  Integumentray: Denies rash, itching or ulcers. Hematologic: Denies easy bruising, bleeding    Physical Exam:      General: Well developed, in no acute distress. HEENT: Eyes - PERRL, no jvd  Heart:  Normal S1/S2 negative S3 or S4. Regular, no murmur, gallop or rub. Respiratory: Clear bilaterally x 4, no wheezing or rales  Abdomen:   Soft, non-tender, bowel sounds are active. Extremities:  No edema, normal cap refill, no cyanosis. Musculoskeletal: No clubbing  Neuro: A&Ox3, speech clear, gait stable. Skin: Skin color is normal. No rashes or lesions.  Non diaphoretic, no ulcers or subcutaneous nodule  Vascular: 2+ pulses symmetric in all extremities  Psych - judgement intact and orientation is wnl     Cardiographics      Results for orders placed or performed during the hospital encounter of 07/30/21   EKG, 12 LEAD, INITIAL   Result Value Ref Range    Ventricular Rate 73 BPM    Atrial Rate 73 BPM    P-R Interval 162 ms    QRS Duration 96 ms    Q-T Interval 420 ms    QTC Calculation (Bezet) 462 ms    Calculated P Axis 66 degrees    Calculated R Axis -30 degrees    Calculated T Axis 27 degrees    Diagnosis       Normal sinus rhythm  Possible Left atrial enlargement  Left axis deviation  Incomplete right bundle branch block  No previous ECGs available  Confirmed by Javier Peres MD, Estelle Rosales ([id number]) on 7/30/2021 10:57:40 AM           Lab Results   Component Value Date/Time    WBC 7.4 07/23/2021 07:53 AM    HGB 16.1 07/23/2021 07:53 AM    HCT 49.1 07/23/2021 07:53 AM    PLATELET 127 65/96/5242 07:53 AM    MCV 93 07/23/2021 07:53 AM      Lab Results   Component Value Date/Time    Sodium 140 07/23/2021 07:53 AM    Potassium 4.6 07/23/2021 07:53 AM    Chloride 103 07/23/2021 07:53 AM    CO2 26 07/23/2021 07:53 AM    Glucose 119 (H) 07/23/2021 07:53 AM    BUN 20 07/23/2021 07:53 AM    Creatinine 1.08 07/23/2021 07:53 AM    BUN/Creatinine ratio 19 07/23/2021 07:53 AM    GFR est AA 90 07/23/2021 07:53 AM    GFR est non-AA 78 07/23/2021 07:53 AM    Calcium 9.5 07/23/2021 07:53 AM    Bilirubin, total 0.4 07/23/2021 07:53 AM    Alk. phosphatase 70 07/23/2021 07:53 AM    Protein, total 7.0 07/23/2021 07:53 AM    Albumin 4.3 07/23/2021 07:53 AM    A-G Ratio 1.6 07/23/2021 07:53 AM    ALT (SGPT) 58 (H) 07/23/2021 07:53 AM         Assessment:     Assessment:        ICD-10-CM ICD-9-CM    1. Paroxysmal atrial fibrillation (HCC)  I48.0 427.31 AMB POC EKG ROUTINE W/ 12 LEADS, INTER & REP      CBC WITH AUTOMATED DIFF      PROTHROMBIN TIME + INR      METABOLIC PANEL, COMPREHENSIVE      XR CHEST PA LAT   2. Nonrheumatic mitral valve regurgitation  I34.0 424.0 CBC WITH AUTOMATED DIFF      PROTHROMBIN TIME + INR      METABOLIC PANEL, COMPREHENSIVE      XR CHEST PA LAT   3. Typical atrial flutter (HCC)  I48.3 427.32 CBC WITH AUTOMATED DIFF      PROTHROMBIN TIME + INR      METABOLIC PANEL, COMPREHENSIVE      XR CHEST PA LAT   4. Mixed hyperlipidemia  E78.2 272.2 CBC WITH AUTOMATED DIFF      PROTHROMBIN TIME + INR      METABOLIC PANEL, COMPREHENSIVE      XR CHEST PA LAT     Orders Placed This Encounter    XR CHEST PA LAT     Standing Status:   Future     Standing Expiration Date:   10/2/2021     Order Specific Question:   Reason for Exam     Answer:   pre op    CBC WITH AUTOMATED DIFF    PROTHROMBIN TIME + INR    METABOLIC PANEL, COMPREHENSIVE    AMB POC EKG ROUTINE W/ 12 LEADS, INTER & REP     Order Specific Question:   Reason for Exam:     Answer:   routine        Plan:   Shonda Greenberg is a pleasant gentleman with AFL/AF on propafenone/toprol. He is stable and compliant with oac.  Ramesh Collado Marta Hopper is a candidate for an afl/afib ablation. I discussed the risks/benefits/alternatives of the procedure with the patient. Risks include (but are not limited to) bleeding, heart block, infection, cva/mi/tamponade/esophageal perforation/pv stenosis/death. The patient understands that there is a 4-5% major complication rate and agrees to proceed. Thank you for this interesting consultation. Post ablation, we will obtain a sleep study to r/o MICHAEL. We will plan to wean off the AADs over the next 6 months. Thank you for allowing me to participate in Irma Lott 's care.     Marilia Rubin MD, Sharmila Zaragoza

## 2021-09-02 NOTE — PROGRESS NOTES
Chief Complaint   Patient presents with    New Patient     Referred by Dr Zoe Nguyen for a flutter - ? ablation - patient states he does not feel -     Shortness of Breath     a little short winded when climbing steps -    Fatigue     improved after [facility] a month ago - but now getting more fatigued      1. Have you been to the ER, urgent care clinic since your last visit? Hospitalized since your last visit? No     2. Have you seen or consulted any other health care providers outside of the [address] [place] [address] since your last visit? Include any pap smears or colon screening.   No

## 2021-09-02 NOTE — PROGRESS NOTES
Subjective:      Yen Hummel is a 50 y.o. male is here for EP consult. Pt with hx of cva, then presented with AF in July was stefanie/cardioverted and started on propafenone/bb. Now presents with AFL with 3:1 conduction. C/o fatigue and difficulty sleeping      Patient Active Problem List    Diagnosis Date Noted    Paroxysmal atrial fibrillation ([address] 07/19/2021    Nonrheumatic mitral valve regurgitation 07/19/2021    Mixed hyperlipidemia 07/19/2021    History of CVA (cerebrovascular accident) 04/04/2019      Angela Camejo MD  Past Medical History:   Diagnosis Date    Arrhythmia     A fib    Atrial fibrillation ([address]     Cerebral artery occlusion with cerebral infarction [facility])     Jan. 2019    GERD (gastroesophageal reflux disease)     Hyperlipidemia     Valvular heart disease       Past Surgical History:   Procedure Laterality Date    HX HEENT      Tonsillectomy as a child     Allergies   Allergen Reactions    Penicillins Rash      Family History   Problem Relation Age of Onset   Loly Krause Stroke Mother     Heart Disease Mother     Diabetes Father     Diabetes Sister     Diabetes Brother     negative for cardiac disease  Social History     Socioeconomic History    Marital status:      Spouse name: Not on file    Number of children: Not on file    Years of education: Not on file    Highest education level: Not on file   Tobacco Use    Smoking status: Never Smoker    Smokeless tobacco: Never Used   Vaping Use    Vaping Use: Never used   Substance and Sexual Activity    Alcohol use:  Yes     Alcohol/week: 7.0 standard drinks     Types: 3 Glasses of wine, 3 Cans of beer, 1 Shots of liquor per week     Comment: per week    Drug use: No     Social Determinants of Health     Financial Resource Strain:     Difficulty of Paying Living Expenses:    Food Insecurity:     Worried About Running Out of Food in the Last Year:     Ran Out of Food in the Last Year:    Transportation Needs:     Lack of Transportation (Medical):  Lack of Transportation (Non-Medical):    Physical Activity:     Days of Exercise per Week:     Minutes of Exercise per Session:    Stress:     Feeling of Stress :    Social Connections:     Frequency of Communication with Friends and Family:     Frequency of Social Gatherings with Friends and Family:     Attends Anglican Services:     Active Member of Clubs or Organizations:     Attends Club or Organization Meetings:     Marital Status:      Current Outpatient Medications   Medication Sig    omeprazole (PRILOSEC) 20 mg capsule Take 20 mg by mouth daily.  atorvastatin (LIPITOR) 40 mg tablet Take 1 Tablet by mouth daily.  propafenone (RYTHMOL) 150 mg tablet Take 1 Tablet by mouth every eight (8) hours.  metoprolol succinate (TOPROL-XL) 25 mg XL tablet Take 1 Tablet by mouth daily.  apixaban (Eliquis) 5 mg tablet Take 1 Tablet by mouth two (2) times a day.  aspirin delayed-release 81 mg tablet Take  by mouth daily.  multivitamin (ONE A DAY) tablet Take 1 Tab by mouth daily.  glucosam-chond-hyalu-CF borate ([facility]) 750 mg-100 mg- 1.65 mg-108 mg tab Take  by mouth.  sildenafil citrate (VIAGRA) 25 mg tablet Take 20 mg by mouth as needed. No current facility-administered medications for this visit. Vitals:    09/02/21 0930   BP: 118/86   Pulse: 73   Resp: 18   SpO2: 98%   Weight: 235 lb 8 oz (106.8 kg)   Height: 5' 10\" (1.778 m)       I have reviewed the nurses notes, vitals, problem list, allergy list, medical history, family, social history and medications. Review of Symptoms:    General: Pt denies excessive weight gain or loss. Pt is able to conduct ADL's, +fatigue  HEENT: Denies blurred vision, headaches, hearing loss, epistaxis and difficulty swallowing. Respiratory: Denies cough, congestion, shortness of breath, PINK, wheezing or stridor.   Cardiovascular: Denies precordial pain, palpitations, edema or PND  Gastrointestinal: Denies poor appetite, indigestion, abdominal pain or blood in stool  Genitourinary: Denies hematuria, dysuria, increased urinary frequency  Musculoskeletal: Denies joint pain or swelling from muscles or joints  Neurologic: Denies tremor, paresthesias, headache, or sensory motor disturbance  Psychiatric: Denies confusion, insomnia, depression  Integumentray: Denies rash, itching or ulcers. Hematologic: Denies easy bruising, bleeding    Physical Exam:      General: Well developed, in no acute distress. HEENT: Eyes - PERRL, no jvd  Heart:  Normal S1/S2 negative S3 or S4. Regular, no murmur, gallop or rub. Respiratory: Clear bilaterally x 4, no wheezing or rales  Abdomen:   Soft, non-tender, bowel sounds are active. Extremities:  No edema, normal cap refill, no cyanosis. Musculoskeletal: No clubbing  Neuro: A&Ox3, speech clear, gait stable. Skin: Skin color is normal. No rashes or lesions.  Non diaphoretic, no ulcers or subcutaneous nodule  Vascular: 2+ pulses symmetric in all extremities  Psych - judgement intact and orientation is wnl     Cardiographics      Results for orders placed or performed during the hospital encounter of 07/30/21   EKG, 12 LEAD, INITIAL   Result Value Ref Range    Ventricular Rate 73 BPM    Atrial Rate 73 BPM    P-R Interval 162 ms    QRS Duration 96 ms    Q-T Interval 420 ms    QTC Calculation (Bezet) 462 ms    Calculated P Axis 66 degrees    Calculated R Axis -30 degrees    Calculated T Axis 27 degrees    Diagnosis       Normal sinus rhythm  Possible Left atrial enlargement  Left axis deviation  Incomplete right bundle branch block  No previous ECGs available  Confirmed by Jefferson Andrews MD, Teresa Salmeron ([id number]) on 7/30/2021 10:57:40 AM           Lab Results   Component Value Date/Time    WBC 7.4 07/23/2021 07:53 AM    HGB 16.1 07/23/2021 07:53 AM    HCT 49.1 07/23/2021 07:53 AM    PLATELET 298 55/95/9788 07:53 AM    MCV 93 07/23/2021 07:53 AM      Lab Results   Component Value Date/Time    Sodium 140 07/23/2021 07:53 AM    Potassium 4.6 07/23/2021 07:53 AM    Chloride 103 07/23/2021 07:53 AM    CO2 26 07/23/2021 07:53 AM    Glucose 119 (H) 07/23/2021 07:53 AM    BUN 20 07/23/2021 07:53 AM    Creatinine 1.08 07/23/2021 07:53 AM    BUN/Creatinine ratio 19 07/23/2021 07:53 AM    GFR est AA 90 07/23/2021 07:53 AM    GFR est non-AA 78 07/23/2021 07:53 AM    Calcium 9.5 07/23/2021 07:53 AM    Bilirubin, total 0.4 07/23/2021 07:53 AM    Alk. phosphatase 70 07/23/2021 07:53 AM    Protein, total 7.0 07/23/2021 07:53 AM    Albumin 4.3 07/23/2021 07:53 AM    A-G Ratio 1.6 07/23/2021 07:53 AM    ALT (SGPT) 58 (H) 07/23/2021 07:53 AM         Assessment:     Assessment:        ICD-10-CM ICD-9-CM    1. Paroxysmal atrial fibrillation (HCC)  I48.0 427.31 AMB POC EKG ROUTINE W/ 12 LEADS, INTER & REP      CBC WITH AUTOMATED DIFF      PROTHROMBIN TIME + INR      METABOLIC PANEL, COMPREHENSIVE      XR CHEST PA LAT   2. Nonrheumatic mitral valve regurgitation  I34.0 424.0 CBC WITH AUTOMATED DIFF      PROTHROMBIN TIME + INR      METABOLIC PANEL, COMPREHENSIVE      XR CHEST PA LAT   3. Typical atrial flutter (HCC)  I48.3 427.32 CBC WITH AUTOMATED DIFF      PROTHROMBIN TIME + INR      METABOLIC PANEL, COMPREHENSIVE      XR CHEST PA LAT   4. Mixed hyperlipidemia  E78.2 272.2 CBC WITH AUTOMATED DIFF      PROTHROMBIN TIME + INR      METABOLIC PANEL, COMPREHENSIVE      XR CHEST PA LAT     Orders Placed This Encounter    XR CHEST PA LAT     Standing Status:   Future     Standing Expiration Date:   10/2/2021     Order Specific Question:   Reason for Exam     Answer:   pre op    CBC WITH AUTOMATED DIFF    PROTHROMBIN TIME + INR    METABOLIC PANEL, COMPREHENSIVE    AMB POC EKG ROUTINE W/ 12 LEADS, INTER & REP     Order Specific Question:   Reason for Exam:     Answer:   routine        Plan:   Yen Hummel is a pleasant gentleman with AFL/AF on propafenone/toprol. He is stable and compliant with oac.  Annetta Bird Sudarshan Dubose is a candidate for an afl/afib ablation. I discussed the risks/benefits/alternatives of the procedure with the patient. Risks include (but are not limited to) bleeding, heart block, infection, cva/mi/tamponade/esophageal perforation/pv stenosis/death. The patient understands that there is a 4-4% major complication rate and agrees to proceed. Thank you for this interesting consultation. Post ablation, we will obtain a sleep study to r/o IMCHAEL. We will plan to wean off the AADs over the next 6 months. Thank you for allowing me to participate in [facility] 's care.     Inge Valero MD, Shekhar Alvarez

## 2021-09-02 NOTE — LETTER
9/2/2021    Patient: Liam Teixeira   YOB: 1965   Date of Visit: 9/2/2021     Maria R Baires, [address] [facility]  [address] [address]  Via Fax: [phone]    Dear Maria R Baires MD,      Thank you for referring Mr. Joshua Knox to [address] [place], [place] [address] for evaluation. My notes for this consultation are attached. If you have questions, please do not hesitate to call me. I look forward to following your patient along with you.       Sincerely,    Lester Mooney MD

## 2021-09-14 LAB
ALBUMIN SERPL-MCNC: 4.5 G/DL (ref 3.8–4.9)
ALBUMIN/GLOB SERPL: 1.7 {RATIO} (ref 1.2–2.2)
ALP SERPL-CCNC: 72 IU/L (ref 44–121)
ALT SERPL-CCNC: 95 IU/L (ref 0–44)
AST SERPL-CCNC: 45 IU/L (ref 0–40)
BASOPHILS # BLD AUTO: 0.1 X10E3/UL (ref 0–0.2)
BASOPHILS NFR BLD AUTO: 1 %
BILIRUB SERPL-MCNC: 0.4 MG/DL (ref 0–1.2)
BUN SERPL-MCNC: 23 MG/DL (ref 6–24)
BUN/CREAT SERPL: 23 (ref 9–20)
CALCIUM SERPL-MCNC: 9.7 MG/DL (ref 8.7–10.2)
CHLORIDE SERPL-SCNC: 103 MMOL/L (ref 96–106)
CO2 SERPL-SCNC: 24 MMOL/L (ref 20–29)
CREAT SERPL-MCNC: 1 MG/DL (ref 0.76–1.27)
EOSINOPHIL # BLD AUTO: 0.1 X10E3/UL (ref 0–0.4)
EOSINOPHIL NFR BLD AUTO: 1 %
ERYTHROCYTE [DISTWIDTH] IN BLOOD BY AUTOMATED COUNT: 12.4 % (ref 11.6–15.4)
GLOBULIN SER CALC-MCNC: 2.7 G/DL (ref 1.5–4.5)
GLUCOSE SERPL-MCNC: 100 MG/DL (ref 65–99)
HCT VFR BLD AUTO: 46.2 % (ref 37.5–51)
HGB BLD-MCNC: 15.8 G/DL (ref 13–17.7)
IMM GRANULOCYTES # BLD AUTO: 0 X10E3/UL (ref 0–0.1)
IMM GRANULOCYTES NFR BLD AUTO: 0 %
INR PPP: 1 (ref 0.9–1.2)
LYMPHOCYTES # BLD AUTO: 2.3 X10E3/UL (ref 0.7–3.1)
LYMPHOCYTES NFR BLD AUTO: 22 %
MCH RBC QN AUTO: 31.9 PG (ref 26.6–33)
MCHC RBC AUTO-ENTMCNC: 34.2 G/DL (ref 31.5–35.7)
MCV RBC AUTO: 93 FL (ref 79–97)
MONOCYTES # BLD AUTO: 1.1 X10E3/UL (ref 0.1–0.9)
MONOCYTES NFR BLD AUTO: 11 %
NEUTROPHILS # BLD AUTO: 6.6 X10E3/UL (ref 1.4–7)
NEUTROPHILS NFR BLD AUTO: 65 %
PLATELET # BLD AUTO: 263 X10E3/UL (ref 150–450)
POTASSIUM SERPL-SCNC: 4.5 MMOL/L (ref 3.5–5.2)
PROT SERPL-MCNC: 7.2 G/DL (ref 6–8.5)
PROTHROMBIN TIME: 10.5 SEC (ref 9.1–12)
RBC # BLD AUTO: 4.95 X10E6/UL (ref 4.14–5.8)
SODIUM SERPL-SCNC: 143 MMOL/L (ref 134–144)
WBC # BLD AUTO: 10.2 X10E3/UL (ref 3.4–10.8)

## 2021-09-16 ENCOUNTER — HOSPITAL ENCOUNTER (OUTPATIENT)
Dept: GENERAL RADIOLOGY | Age: 56
Discharge: HOME | End: 2021-09-16
Payer: COMMERCIAL

## 2021-09-16 ENCOUNTER — HOSPITAL ENCOUNTER (OUTPATIENT)
Dept: PREADMISSION TESTING | Age: 56
Discharge: HOME | End: 2021-09-16
Payer: COMMERCIAL

## 2021-09-16 DIAGNOSIS — E78.2 MIXED HYPERLIPIDEMIA: ICD-10-CM

## 2021-09-16 DIAGNOSIS — I34.0 NONRHEUMATIC MITRAL VALVE REGURGITATION: ICD-10-CM

## 2021-09-16 DIAGNOSIS — I48.3 TYPICAL ATRIAL FLUTTER (HCC): ICD-10-CM

## 2021-09-16 DIAGNOSIS — I48.0 PAROXYSMAL ATRIAL FIBRILLATION (HCC): ICD-10-CM

## 2021-09-16 LAB
SARS-COV-2, XPLCVT: NOT DETECTED
SOURCE, COVRS: NORMAL

## 2021-09-16 PROCEDURE — U0005 INFEC AGEN DETEC AMPLI PROBE: HCPCS

## 2021-09-16 PROCEDURE — 71046 X-RAY EXAM CHEST 2 VIEWS: CPT

## 2021-09-20 ENCOUNTER — ANESTHESIA EVENT (OUTPATIENT)
Dept: CARDIAC CATH/INVASIVE PROCEDURES | Age: 56
DRG: 274 | End: 2021-09-20
Payer: COMMERCIAL

## 2021-09-20 ENCOUNTER — ANESTHESIA (OUTPATIENT)
Dept: CARDIAC CATH/INVASIVE PROCEDURES | Age: 56
DRG: 274 | End: 2021-09-20
Payer: COMMERCIAL

## 2021-09-20 ENCOUNTER — APPOINTMENT (OUTPATIENT)
Dept: CARDIAC CATH/INVASIVE PROCEDURES | Age: 56
DRG: 274 | End: 2021-09-20
Attending: INTERNAL MEDICINE
Payer: COMMERCIAL

## 2021-09-20 ENCOUNTER — HOSPITAL ENCOUNTER (OUTPATIENT)
Age: 56
Discharge: HOME | DRG: 274 | End: 2021-09-20
Attending: INTERNAL MEDICINE | Admitting: INTERNAL MEDICINE
Payer: COMMERCIAL

## 2021-09-20 VITALS
TEMPERATURE: 97.5 F | WEIGHT: 235 LBS | DIASTOLIC BLOOD PRESSURE: 72 MMHG | HEIGHT: 70 IN | BODY MASS INDEX: 33.64 KG/M2 | OXYGEN SATURATION: 98 % | SYSTOLIC BLOOD PRESSURE: 117 MMHG | RESPIRATION RATE: 16 BRPM | HEART RATE: 79 BPM

## 2021-09-20 DIAGNOSIS — Z86.73 HISTORY OF CVA (CEREBROVASCULAR ACCIDENT): Chronic | ICD-10-CM

## 2021-09-20 DIAGNOSIS — E78.2 MIXED HYPERLIPIDEMIA: Chronic | ICD-10-CM

## 2021-09-20 DIAGNOSIS — I48.91 ATRIAL FIBRILLATION, UNSPECIFIED TYPE (HCC): ICD-10-CM

## 2021-09-20 DIAGNOSIS — I48.0 PAROXYSMAL ATRIAL FIBRILLATION (HCC): Chronic | ICD-10-CM

## 2021-09-20 DIAGNOSIS — I34.0 NONRHEUMATIC MITRAL VALVE REGURGITATION: ICD-10-CM

## 2021-09-20 LAB
ACT BLD: 120 SECS (ref 79–138)
ACT BLD: 301 SECS (ref 79–138)
ACT BLD: 334 SECS (ref 79–138)

## 2021-09-20 PROCEDURE — C1894 INTRO/SHEATH, NON-LASER: HCPCS | Performed by: INTERNAL MEDICINE

## 2021-09-20 PROCEDURE — 77030040754 HC DRSG QCLOT ZMED -D: Performed by: INTERNAL MEDICINE

## 2021-09-20 PROCEDURE — 02K83ZZ MAP CONDUCTION MECHANISM, PERCUTANEOUS APPROACH: ICD-10-PCS | Performed by: INTERNAL MEDICINE

## 2021-09-20 PROCEDURE — 74011000250 HC RX REV CODE- 250: Performed by: NURSE ANESTHETIST, CERTIFIED REGISTERED

## 2021-09-20 PROCEDURE — 74011250636 HC RX REV CODE- 250/636: Performed by: NURSE ANESTHETIST, CERTIFIED REGISTERED

## 2021-09-20 PROCEDURE — C1730 CATH, EP, 19 OR FEW ELECT: HCPCS | Performed by: INTERNAL MEDICINE

## 2021-09-20 PROCEDURE — 93656 COMPRE EP EVAL ABLTJ ATR FIB: CPT | Performed by: INTERNAL MEDICINE

## 2021-09-20 PROCEDURE — 4A023FZ MEASUREMENT OF CARDIAC RHYTHM, PERCUTANEOUS APPROACH: ICD-10-PCS | Performed by: INTERNAL MEDICINE

## 2021-09-20 PROCEDURE — 02583ZZ DESTRUCTION OF CONDUCTION MECHANISM, PERCUTANEOUS APPROACH: ICD-10-PCS | Performed by: INTERNAL MEDICINE

## 2021-09-20 PROCEDURE — 4A0234Z MEASUREMENT OF CARDIAC ELECTRICAL ACTIVITY, PERCUTANEOUS APPROACH: ICD-10-PCS | Performed by: INTERNAL MEDICINE

## 2021-09-20 PROCEDURE — B24BZZ4 ULTRASONOGRAPHY OF HEART WITH AORTA, TRANSESOPHAGEAL: ICD-10-PCS | Performed by: INTERNAL MEDICINE

## 2021-09-20 PROCEDURE — 77010033678 HC OXYGEN DAILY

## 2021-09-20 PROCEDURE — C1769 GUIDE WIRE: HCPCS | Performed by: INTERNAL MEDICINE

## 2021-09-20 PROCEDURE — 74011000250 HC RX REV CODE- 250: Performed by: INTERNAL MEDICINE

## 2021-09-20 PROCEDURE — 77030013797 HC KT TRNSDUC PRSSR EDWD -A: Performed by: INTERNAL MEDICINE

## 2021-09-20 PROCEDURE — 93662 INTRACARDIAC ECG (ICE): CPT | Performed by: INTERNAL MEDICINE

## 2021-09-20 PROCEDURE — 77030008684 HC TU ET CUF COVD -B: Performed by: NURSE ANESTHETIST, CERTIFIED REGISTERED

## 2021-09-20 PROCEDURE — 77030029065 HC DRSG HEMO QCLOT ZMED -B: Performed by: INTERNAL MEDICINE

## 2021-09-20 PROCEDURE — 76060000034 HC ANESTHESIA 1.5 TO 2 HR: Performed by: INTERNAL MEDICINE

## 2021-09-20 PROCEDURE — C1733 CATH, EP, OTHR THAN COOL-TIP: HCPCS | Performed by: INTERNAL MEDICINE

## 2021-09-20 PROCEDURE — 77030015398 HC CBL EP EXT STJU -C: Performed by: INTERNAL MEDICINE

## 2021-09-20 PROCEDURE — 77030018733 HC ELECTRD KT ENSITE STJU -G: Performed by: INTERNAL MEDICINE

## 2021-09-20 PROCEDURE — 77030030261 HC CBL UMB ELEC DISP MEDT -C: Performed by: INTERNAL MEDICINE

## 2021-09-20 PROCEDURE — 77030029163 HC CBL EP DX ACHV DISP MEDT -C: Performed by: INTERNAL MEDICINE

## 2021-09-20 PROCEDURE — 77030013079 HC BLNKT BAIR HGGR 3M -A: Performed by: NURSE ANESTHETIST, CERTIFIED REGISTERED

## 2021-09-20 PROCEDURE — 77030010880 HC CBL EP SUPRME STJU -C: Performed by: INTERNAL MEDICINE

## 2021-09-20 PROCEDURE — 74011000258 HC RX REV CODE- 258: Performed by: NURSE ANESTHETIST, CERTIFIED REGISTERED

## 2021-09-20 PROCEDURE — C1759 CATH, INTRA ECHOCARDIOGRAPHY: HCPCS | Performed by: INTERNAL MEDICINE

## 2021-09-20 PROCEDURE — 77030026438 HC STYL ET INTUB CARD -A: Performed by: NURSE ANESTHETIST, CERTIFIED REGISTERED

## 2021-09-20 PROCEDURE — 2709999900 HC NON-CHARGEABLE SUPPLY: Performed by: INTERNAL MEDICINE

## 2021-09-20 PROCEDURE — 85347 COAGULATION TIME ACTIVATED: CPT

## 2021-09-20 PROCEDURE — 93613 INTRACARDIAC EPHYS 3D MAPG: CPT | Performed by: INTERNAL MEDICINE

## 2021-09-20 PROCEDURE — 77030008543 HC TBNG MON PRSS MRTM -A: Performed by: INTERNAL MEDICINE

## 2021-09-20 PROCEDURE — 77030016894 HC CBL EP DX CATH3 STJU -B: Performed by: INTERNAL MEDICINE

## 2021-09-20 PROCEDURE — 74011250636 HC RX REV CODE- 250/636: Performed by: INTERNAL MEDICINE

## 2021-09-20 PROCEDURE — 77030030278 HC COAX UMB DISP MEDT -C: Performed by: INTERNAL MEDICINE

## 2021-09-20 PROCEDURE — 77030029359 HC PRB ESOPH TEMP CATH ANTM -F: Performed by: INTERNAL MEDICINE

## 2021-09-20 PROCEDURE — 76210000006 HC OR PH I REC 0.5 TO 1 HR

## 2021-09-20 RX ORDER — LIDOCAINE HYDROCHLORIDE 20 MG/ML
INJECTION, SOLUTION EPIDURAL; INFILTRATION; INTRACAUDAL; PERINEURAL AS NEEDED
Status: DISCONTINUED | OUTPATIENT
Start: 2021-09-20 | End: 2021-09-20 | Stop reason: HOSPADM

## 2021-09-20 RX ORDER — LIDOCAINE HYDROCHLORIDE 10 MG/ML
0.1 INJECTION, SOLUTION EPIDURAL; INFILTRATION; INTRACAUDAL; PERINEURAL AS NEEDED
Status: DISCONTINUED | OUTPATIENT
Start: 2021-09-20 | End: 2021-09-20 | Stop reason: HOSPADM

## 2021-09-20 RX ORDER — DIPHENHYDRAMINE HYDROCHLORIDE 50 MG/ML
12.5 INJECTION, SOLUTION INTRAMUSCULAR; INTRAVENOUS AS NEEDED
Status: DISCONTINUED | OUTPATIENT
Start: 2021-09-20 | End: 2021-09-20 | Stop reason: HOSPADM

## 2021-09-20 RX ORDER — HYDROMORPHONE HYDROCHLORIDE 1 MG/ML
0.2 INJECTION, SOLUTION INTRAMUSCULAR; INTRAVENOUS; SUBCUTANEOUS
Status: DISCONTINUED | OUTPATIENT
Start: 2021-09-20 | End: 2021-09-20 | Stop reason: HOSPADM

## 2021-09-20 RX ORDER — PROPOFOL 10 MG/ML
INJECTION, EMULSION INTRAVENOUS AS NEEDED
Status: DISCONTINUED | OUTPATIENT
Start: 2021-09-20 | End: 2021-09-20 | Stop reason: HOSPADM

## 2021-09-20 RX ORDER — FENTANYL CITRATE 50 UG/ML
25 INJECTION, SOLUTION INTRAMUSCULAR; INTRAVENOUS
Status: DISCONTINUED | OUTPATIENT
Start: 2021-09-20 | End: 2021-09-20 | Stop reason: HOSPADM

## 2021-09-20 RX ORDER — SODIUM CHLORIDE 0.9 % (FLUSH) 0.9 %
5-40 SYRINGE (ML) INJECTION EVERY 8 HOURS
Status: DISCONTINUED | OUTPATIENT
Start: 2021-09-20 | End: 2021-09-20 | Stop reason: HOSPADM

## 2021-09-20 RX ORDER — ROCURONIUM BROMIDE 10 MG/ML
INJECTION, SOLUTION INTRAVENOUS AS NEEDED
Status: DISCONTINUED | OUTPATIENT
Start: 2021-09-20 | End: 2021-09-20 | Stop reason: HOSPADM

## 2021-09-20 RX ORDER — FENTANYL CITRATE 50 UG/ML
50 INJECTION, SOLUTION INTRAMUSCULAR; INTRAVENOUS AS NEEDED
Status: DISCONTINUED | OUTPATIENT
Start: 2021-09-20 | End: 2021-09-20 | Stop reason: HOSPADM

## 2021-09-20 RX ORDER — HEPARIN SODIUM 200 [USP'U]/100ML
INJECTION, SOLUTION INTRAVENOUS
Status: COMPLETED | OUTPATIENT
Start: 2021-09-20 | End: 2021-09-20

## 2021-09-20 RX ORDER — SODIUM CHLORIDE 9 MG/ML
50 INJECTION, SOLUTION INTRAVENOUS CONTINUOUS
Status: DISCONTINUED | OUTPATIENT
Start: 2021-09-20 | End: 2021-09-20 | Stop reason: HOSPADM

## 2021-09-20 RX ORDER — SUCCINYLCHOLINE CHLORIDE 20 MG/ML
INJECTION INTRAMUSCULAR; INTRAVENOUS AS NEEDED
Status: DISCONTINUED | OUTPATIENT
Start: 2021-09-20 | End: 2021-09-20 | Stop reason: HOSPADM

## 2021-09-20 RX ORDER — MIDAZOLAM HYDROCHLORIDE 1 MG/ML
1 INJECTION, SOLUTION INTRAMUSCULAR; INTRAVENOUS AS NEEDED
Status: DISCONTINUED | OUTPATIENT
Start: 2021-09-20 | End: 2021-09-20 | Stop reason: HOSPADM

## 2021-09-20 RX ORDER — PROTAMINE SULFATE 10 MG/ML
INJECTION, SOLUTION INTRAVENOUS AS NEEDED
Status: DISCONTINUED | OUTPATIENT
Start: 2021-09-20 | End: 2021-09-20 | Stop reason: HOSPADM

## 2021-09-20 RX ORDER — ONDANSETRON 2 MG/ML
INJECTION INTRAMUSCULAR; INTRAVENOUS AS NEEDED
Status: DISCONTINUED | OUTPATIENT
Start: 2021-09-20 | End: 2021-09-20 | Stop reason: HOSPADM

## 2021-09-20 RX ORDER — SODIUM CHLORIDE 0.9 % (FLUSH) 0.9 %
5-40 SYRINGE (ML) INJECTION AS NEEDED
Status: DISCONTINUED | OUTPATIENT
Start: 2021-09-20 | End: 2021-09-20 | Stop reason: HOSPADM

## 2021-09-20 RX ORDER — HEPARIN SODIUM 1000 [USP'U]/ML
INJECTION, SOLUTION INTRAVENOUS; SUBCUTANEOUS AS NEEDED
Status: DISCONTINUED | OUTPATIENT
Start: 2021-09-20 | End: 2021-09-20 | Stop reason: HOSPADM

## 2021-09-20 RX ORDER — GLYCOPYRROLATE 0.2 MG/ML
INJECTION INTRAMUSCULAR; INTRAVENOUS AS NEEDED
Status: DISCONTINUED | OUTPATIENT
Start: 2021-09-20 | End: 2021-09-20 | Stop reason: HOSPADM

## 2021-09-20 RX ORDER — MORPHINE SULFATE 2 MG/ML
2 INJECTION, SOLUTION INTRAMUSCULAR; INTRAVENOUS
Status: DISCONTINUED | OUTPATIENT
Start: 2021-09-20 | End: 2021-09-20 | Stop reason: HOSPADM

## 2021-09-20 RX ORDER — NEOSTIGMINE METHYLSULFATE 1 MG/ML
INJECTION, SOLUTION INTRAVENOUS AS NEEDED
Status: DISCONTINUED | OUTPATIENT
Start: 2021-09-20 | End: 2021-09-20 | Stop reason: HOSPADM

## 2021-09-20 RX ORDER — SODIUM CHLORIDE, SODIUM LACTATE, POTASSIUM CHLORIDE, CALCIUM CHLORIDE 600; 310; 30; 20 MG/100ML; MG/100ML; MG/100ML; MG/100ML
75 INJECTION, SOLUTION INTRAVENOUS CONTINUOUS
Status: DISCONTINUED | OUTPATIENT
Start: 2021-09-20 | End: 2021-09-20 | Stop reason: HOSPADM

## 2021-09-20 RX ORDER — FENTANYL CITRATE 50 UG/ML
INJECTION, SOLUTION INTRAMUSCULAR; INTRAVENOUS AS NEEDED
Status: DISCONTINUED | OUTPATIENT
Start: 2021-09-20 | End: 2021-09-20 | Stop reason: HOSPADM

## 2021-09-20 RX ORDER — SODIUM CHLORIDE 9 MG/ML
INJECTION, SOLUTION INTRAVENOUS
Status: DISCONTINUED | OUTPATIENT
Start: 2021-09-20 | End: 2021-09-20 | Stop reason: HOSPADM

## 2021-09-20 RX ORDER — MIDAZOLAM HYDROCHLORIDE 1 MG/ML
0.5 INJECTION, SOLUTION INTRAMUSCULAR; INTRAVENOUS
Status: DISCONTINUED | OUTPATIENT
Start: 2021-09-20 | End: 2021-09-20 | Stop reason: HOSPADM

## 2021-09-20 RX ORDER — PHENYLEPHRINE HCL IN 0.9% NACL 0.4MG/10ML
SYRINGE (ML) INTRAVENOUS AS NEEDED
Status: DISCONTINUED | OUTPATIENT
Start: 2021-09-20 | End: 2021-09-20 | Stop reason: HOSPADM

## 2021-09-20 RX ORDER — ONDANSETRON 2 MG/ML
4 INJECTION INTRAMUSCULAR; INTRAVENOUS AS NEEDED
Status: DISCONTINUED | OUTPATIENT
Start: 2021-09-20 | End: 2021-09-20 | Stop reason: HOSPADM

## 2021-09-20 RX ORDER — OXYCODONE AND ACETAMINOPHEN 5; 325 MG/1; MG/1
1 TABLET ORAL AS NEEDED
Status: DISCONTINUED | OUTPATIENT
Start: 2021-09-20 | End: 2021-09-20 | Stop reason: HOSPADM

## 2021-09-20 RX ORDER — MIDAZOLAM HYDROCHLORIDE 1 MG/ML
INJECTION, SOLUTION INTRAMUSCULAR; INTRAVENOUS AS NEEDED
Status: DISCONTINUED | OUTPATIENT
Start: 2021-09-20 | End: 2021-09-20 | Stop reason: HOSPADM

## 2021-09-20 RX ORDER — DEXAMETHASONE SODIUM PHOSPHATE 4 MG/ML
INJECTION, SOLUTION INTRA-ARTICULAR; INTRALESIONAL; INTRAMUSCULAR; INTRAVENOUS; SOFT TISSUE AS NEEDED
Status: DISCONTINUED | OUTPATIENT
Start: 2021-09-20 | End: 2021-09-20 | Stop reason: HOSPADM

## 2021-09-20 RX ADMIN — HEPARIN SODIUM 5000 UNITS: 1000 INJECTION, SOLUTION INTRAVENOUS; SUBCUTANEOUS at 13:59

## 2021-09-20 RX ADMIN — PROPOFOL 180 MG: 10 INJECTION, EMULSION INTRAVENOUS at 13:20

## 2021-09-20 RX ADMIN — PROTAMINE SULFATE 100 MG: 10 INJECTION, SOLUTION INTRAVENOUS at 14:21

## 2021-09-20 RX ADMIN — NEOSTIGMINE METHYLSULFATE 3 MG: 1 INJECTION, SOLUTION INTRAVENOUS at 14:21

## 2021-09-20 RX ADMIN — FENTANYL CITRATE 25 MCG: 50 INJECTION, SOLUTION INTRAMUSCULAR; INTRAVENOUS at 13:30

## 2021-09-20 RX ADMIN — HEPARIN SODIUM 16000 UNITS: 1000 INJECTION, SOLUTION INTRAVENOUS; SUBCUTANEOUS at 13:37

## 2021-09-20 RX ADMIN — ROCURONIUM BROMIDE 10 MG: 10 INJECTION INTRAVENOUS at 13:20

## 2021-09-20 RX ADMIN — LIDOCAINE HYDROCHLORIDE 100 MG: 20 INJECTION, SOLUTION INTRAVENOUS at 13:20

## 2021-09-20 RX ADMIN — FENTANYL CITRATE 25 MCG: 50 INJECTION, SOLUTION INTRAMUSCULAR; INTRAVENOUS at 14:46

## 2021-09-20 RX ADMIN — MIDAZOLAM HYDROCHLORIDE 2 MG: 1 INJECTION, SOLUTION INTRAMUSCULAR; INTRAVENOUS at 13:15

## 2021-09-20 RX ADMIN — GLYCOPYRROLATE 0.2 MG: 0.2 INJECTION, SOLUTION INTRAMUSCULAR; INTRAVENOUS at 13:43

## 2021-09-20 RX ADMIN — GLYCOPYRROLATE 0.4 MG: 0.2 INJECTION, SOLUTION INTRAMUSCULAR; INTRAVENOUS at 14:21

## 2021-09-20 RX ADMIN — SUCCINYLCHOLINE CHLORIDE 160 MG: 20 INJECTION, SOLUTION INTRAMUSCULAR; INTRAVENOUS at 13:20

## 2021-09-20 RX ADMIN — Medication 40 MCG/MIN: at 13:37

## 2021-09-20 RX ADMIN — DEXAMETHASONE SODIUM PHOSPHATE 4 MG: 4 INJECTION, SOLUTION INTRAMUSCULAR; INTRAVENOUS at 13:44

## 2021-09-20 RX ADMIN — FENTANYL CITRATE 50 MCG: 50 INJECTION, SOLUTION INTRAMUSCULAR; INTRAVENOUS at 13:20

## 2021-09-20 RX ADMIN — ONDANSETRON HYDROCHLORIDE 4 MG: 2 INJECTION, SOLUTION INTRAMUSCULAR; INTRAVENOUS at 14:21

## 2021-09-20 RX ADMIN — SODIUM CHLORIDE: 9 INJECTION, SOLUTION INTRAVENOUS at 13:35

## 2021-09-20 NOTE — Clinical Note
A venogram was performed on the Other (document in comment). Injected with single hand injection. Injection volume  = 5 mL.

## 2021-09-20 NOTE — PERIOP NOTES
Handoff Report from Operating Room to PACU    Report received from [facility] and ASAD King CRNA regarding Shonda Greenberg. Surgeon(s):  Anesthesia, Case  And Procedure(s) (LRB):  SPECIAL PROCEDURE OUTSIDE OF OR (N/A)  confirmed   with allergies and dressings discussed. Anesthesia type, drugs, patient history, complications, estimated blood loss, vital signs, intake and output, and last pain medication, lines, reversal medications and temperature were reviewed.

## 2021-09-20 NOTE — INTERVAL H&P NOTE
Update History & Physical    The Patient's History and Physical of September 2, 2021 was reviewed with the patient and I examined the patient. There was no change. The surgical site was confirmed by the patient and me. Plan:  The risk, benefits, expected outcome, and alternative to the recommended procedure have been discussed with the patient. Patient understands and wants to proceed with the procedure.     Electronically signed by Kimmy Suggs MD on 9/20/2021 at 12:41 PM

## 2021-09-20 NOTE — Clinical Note
Sheath #1: sheath exchanged for SET SHEATH INTRO PGTL 180 CM 45 DEG 8.5 FRX63 CM STD VERSACROSS W/CONN CBL. Hemostasis achieved.  8fr sheath RFV removed/ VersaCross sheath advanced with RF VersaCross wire/ aspirated and flushed

## 2021-09-20 NOTE — DISCHARGE INSTRUCTIONS
[address], [address] [facility] [address], [address] [facility]  [phone]        ATRIAL FIBRILLATION ABLATION DISCHARGE INSTRUCTIONS    Patient ID:  Gómez Jett  [id number]  3 y.o.  1965    Admit Date: 9/20/2021    Discharge Date: 9/20/2021     Admitting Physician: Alba Baumgarten, MD     Discharge Physician: Alba Baumgarten, MD    Admission Diagnoses:   Atrial fibrillation, unspecified type [facility]) [I48.91]    Discharge Diagnoses: Active Problems:    History of CVA (cerebrovascular accident) (4/4/2019)      Paroxysmal atrial fibrillation ([address] (7/19/2021)      Mixed hyperlipidemia (7/19/2021)        Discharge Condition: Good    Cardiology Procedures this Admission:  Atrial fibrillation. Disposition: home    Reference discharge instructions provided by nursing for diet and activity. Follow-up with Dr Nhung Mendoza or his Nurse Practitioners in 1-2 weeks. Call [phone] to make an appointment. Signed:  YANA Mulligan  9/20/2021  2:20 PM    S/P ATRIAL FIBRILLATION ABLATION DISCHARGE INSTRUCTIONS    It is normal to feel tired the first couple days. Take it easy and follow the physicians instructions. CHECK THE CATHETER INSERTION SITE DAILY:  You may shower 24 hours after the procedure, remove the bandage during showering. Wash with soap and water and pat dry. Gentle cleaning of the site with soap and water is sufficient, cover with a dry clean dressing or bandage. Do not apply creams or powders to the area. Do not sit in a bathtub or pool of water for 7 days or until wound has completely healed. Temporary bruising and discomfort is normal and may last a few weeks. You may have a  formation of a small lump at the site which may last up to 6 weeks. CALL THE PHYSICIAN:  If the site becomes red, swollen or feels warm to the touch  If there is bleeding or drainage or if there is unusual pain at the groin or down the leg.   If there is any bleeding, lie down, apply pressure or have someone apply pressure with a clean cloth until the bleeding stops. If the bleeding continues, call 911 to be transported to the hospital.  DO NOT DRIVE YOURSELF, Laura Huerta. Activity:      For the first 24-48 hours or as instructed by the physician:  No lifting, pushing or pulling over 5 pounds and no straining the insertion site. Do not lift grocery bags or the garbage can, do not run the vacuum  or  for 7 days. Start with short walks as in the hospital and gradually increase as tolerated each day. It is recommended to walk 30 minutes 5-7 days per week. Follow your physicians instructions on activity. Avoid walking outside in extremes of heat or cold. Walk inside when it is cold and windy or hot and humid. Things to keep in mind:  No driving for at least 5 days or as designated by your physician. Limit the number of times you go up and down the stairs  Take rests and pace yourself with activity. Be careful and do not strain with bowel movements. Medications: Take all medications as prescribed  Call your physician if you have any questions  Keep an updated list of your medications with you at all times and give a list to your physician and pharmacist    Signs and Symptoms:  Be cautious of symptoms of angina or recurrent symptoms such as chest discomfort, unusual shortness of breath or fatigue, palpitations. After Care: Follow up with your physician as instructed. Follow a heart healthy diet with proper portion control, daily stress management, daily exercise, blood pressure and cholesterol control , and smoking cessation.

## 2021-09-20 NOTE — PROGRESS NOTES
1549: Pt arrived from PACU. Bilateral groins CDI, no hematoma. 1554: HOB elevated to 30 degrees. Bilateral groins CDI, no hematoma. 1854: Pt ambulated in [facility] independently with no complaints. Bilateral groins CDI, nohematoma. Discharge instructions discussed with patient. All questions answered. Patient verbalized understanding. Cath site CDI, no hematoma. Care instructions and restrictions reviewed. Patient instructed to make follow up appts per discharge instructions. Patient signed discharge instructions after reviewing them and duplicate copy placed in chart. Belongings gathered and accounted for. Telemetry monitor and IV removed. Tolerating ambulation in room and hallway. Denies CP, SOB, or dizziness. Escorted out via w/c, discharged home with family in a private vehicle.

## 2021-09-20 NOTE — ANESTHESIA PREPROCEDURE EVALUATION
Relevant Problems   No relevant active problems       Anesthetic History   No history of anesthetic complications            Review of Systems / Medical History  Patient summary reviewed, nursing notes reviewed and pertinent labs reviewed    Pulmonary  Within defined limits                 Neuro/Psych       CVA       Cardiovascular            Dysrhythmias : atrial fibrillation and atrial flutter  Hyperlipidemia    Exercise tolerance: >4 METS     GI/Hepatic/Renal     GERD: well controlled           Endo/Other  Within defined limits           Other Findings              Physical Exam    Airway  Mallampati: II  TM Distance: > 6 cm  Neck ROM: normal range of motion   Mouth opening: Normal     Cardiovascular    Rhythm: irregular  Rate: normal         Dental    Dentition: Caps/crowns     Pulmonary  Breath sounds clear to auscultation               Abdominal  GI exam deferred       Other Findings            Anesthetic Plan    ASA: 3  Anesthesia type: general          Induction: Intravenous  Anesthetic plan and risks discussed with: Patient

## 2021-09-20 NOTE — PERIOP NOTES
TRANSFER - OUT REPORT:    Verbal report given to Clarissa Lees RN(name) on Gerardo Upton being transferred to IVCU/2156(unit) for routine post - op       Report consisted of patient's Situation, Background, Assessment and   Recommendations(SBAR). Information from the following report(s) SBAR, OR Summary, Intake/Output, MAR and Cardiac Rhythm NSR w/occasional PVC was reviewed with the receiving nurse. Opportunity for questions and clarification was provided.       Patient transported with:   Monitor  Registered Nurse  Tech

## 2021-09-20 NOTE — ANESTHESIA POSTPROCEDURE EVALUATION
Procedure(s):  ABLATION A-FIB  W COMPLETE EP STUDY  Ep 3d Mapping  Intracardiac Echocardiogram.    general    Anesthesia Post Evaluation      Multimodal analgesia: multimodal analgesia used between 6 hours prior to anesthesia start to PACU discharge  Patient location during evaluation: PACU  Patient participation: complete - patient participated  Level of consciousness: awake  Pain management: adequate  Airway patency: patent  Anesthetic complications: no  Cardiovascular status: acceptable  Respiratory status: acceptable  Hydration status: acceptable  Comments: Seen, no complaints   Post anesthesia nausea and vomiting:  none  Final Post Anesthesia Temperature Assessment:  Normothermia (36.0-37.5 degrees C)      INITIAL Post-op Vital signs:   Vitals Value Taken Time   /53 09/20/21 1505   Temp 36.4 °C (97.5 °F) 09/20/21 1459   Pulse 69 09/20/21 1508   Resp 14 09/20/21 1508   SpO2 99 % 09/20/21 1508   Vitals shown include unvalidated device data.

## 2021-09-21 ENCOUNTER — APPOINTMENT (OUTPATIENT)
Dept: GENERAL RADIOLOGY | Age: 56
DRG: 274 | End: 2021-09-21
Attending: EMERGENCY MEDICINE
Payer: COMMERCIAL

## 2021-09-21 ENCOUNTER — HOSPITAL ENCOUNTER (INPATIENT)
Age: 56
LOS: 1 days | Discharge: HOME | DRG: 274 | End: 2021-09-22
Attending: EMERGENCY MEDICINE | Admitting: INTERNAL MEDICINE
Payer: COMMERCIAL

## 2021-09-21 ENCOUNTER — TELEPHONE (OUTPATIENT)
Dept: CARDIOLOGY CLINIC | Age: 56
End: 2021-09-21

## 2021-09-21 ENCOUNTER — APPOINTMENT (OUTPATIENT)
Dept: CT IMAGING | Age: 56
DRG: 274 | End: 2021-09-21
Attending: EMERGENCY MEDICINE
Payer: COMMERCIAL

## 2021-09-21 DIAGNOSIS — R06.00 DYSPNEA, UNSPECIFIED TYPE: ICD-10-CM

## 2021-09-21 DIAGNOSIS — R07.9 CHEST PAIN, UNSPECIFIED TYPE: Primary | ICD-10-CM

## 2021-09-21 DIAGNOSIS — R77.8 ELEVATED TROPONIN: ICD-10-CM

## 2021-09-21 LAB
ANION GAP SERPL CALC-SCNC: 1 MMOL/L (ref 5–15)
BASOPHILS # BLD: 0 K/UL (ref 0–0.1)
BASOPHILS NFR BLD: 0 % (ref 0–1)
BUN SERPL-MCNC: 20 MG/DL (ref 6–20)
BUN/CREAT SERPL: 21 (ref 12–20)
CALCIUM SERPL-MCNC: 8.9 MG/DL (ref 8.5–10.1)
CHLORIDE SERPL-SCNC: 105 MMOL/L (ref 97–108)
CO2 SERPL-SCNC: 30 MMOL/L (ref 21–32)
COMMENT, HOLDF: NORMAL
CREAT SERPL-MCNC: 0.94 MG/DL (ref 0.7–1.3)
DIFFERENTIAL METHOD BLD: ABNORMAL
EOSINOPHIL # BLD: 0.1 K/UL (ref 0–0.4)
EOSINOPHIL NFR BLD: 1 % (ref 0–7)
ERYTHROCYTE [DISTWIDTH] IN BLOOD BY AUTOMATED COUNT: 12.7 % (ref 11.5–14.5)
GLUCOSE SERPL-MCNC: 119 MG/DL (ref 65–100)
HCT VFR BLD AUTO: 42.9 % (ref 36.6–50.3)
HGB BLD-MCNC: 13.9 G/DL (ref 12.1–17)
IMM GRANULOCYTES # BLD AUTO: 0.1 K/UL (ref 0–0.04)
IMM GRANULOCYTES NFR BLD AUTO: 0 % (ref 0–0.5)
LYMPHOCYTES # BLD: 1.7 K/UL (ref 0.8–3.5)
LYMPHOCYTES NFR BLD: 12 % (ref 12–49)
MCH RBC QN AUTO: 31.6 PG (ref 26–34)
MCHC RBC AUTO-ENTMCNC: 32.4 G/DL (ref 30–36.5)
MCV RBC AUTO: 97.5 FL (ref 80–99)
MONOCYTES # BLD: 1.6 K/UL (ref 0–1)
MONOCYTES NFR BLD: 12 % (ref 5–13)
NEUTS SEG # BLD: 10.4 K/UL (ref 1.8–8)
NEUTS SEG NFR BLD: 75 % (ref 32–75)
NRBC # BLD: 0 K/UL (ref 0–0.01)
NRBC BLD-RTO: 0 PER 100 WBC
PLATELET # BLD AUTO: 241 K/UL (ref 150–400)
PMV BLD AUTO: 10.9 FL (ref 8.9–12.9)
POTASSIUM SERPL-SCNC: 4 MMOL/L (ref 3.5–5.1)
RBC # BLD AUTO: 4.4 M/UL (ref 4.1–5.7)
SAMPLES BEING HELD,HOLD: NORMAL
SODIUM SERPL-SCNC: 136 MMOL/L (ref 136–145)
TROPONIN I SERPL-MCNC: 4.3 NG/ML
TROPONIN I SERPL-MCNC: 5.75 NG/ML
WBC # BLD AUTO: 13.9 K/UL (ref 4.1–11.1)

## 2021-09-21 PROCEDURE — 65660000000 HC RM CCU STEPDOWN

## 2021-09-21 PROCEDURE — 84484 ASSAY OF TROPONIN QUANT: CPT

## 2021-09-21 PROCEDURE — 80048 BASIC METABOLIC PNL TOTAL CA: CPT

## 2021-09-21 PROCEDURE — 74011000250 HC RX REV CODE- 250: Performed by: EMERGENCY MEDICINE

## 2021-09-21 PROCEDURE — 70491 CT SOFT TISSUE NECK W/DYE: CPT

## 2021-09-21 PROCEDURE — 96372 THER/PROPH/DIAG INJ SC/IM: CPT

## 2021-09-21 PROCEDURE — 36415 COLL VENOUS BLD VENIPUNCTURE: CPT

## 2021-09-21 PROCEDURE — 85025 COMPLETE CBC W/AUTO DIFF WBC: CPT

## 2021-09-21 PROCEDURE — 74011250636 HC RX REV CODE- 250/636: Performed by: EMERGENCY MEDICINE

## 2021-09-21 PROCEDURE — 99284 EMERGENCY DEPT VISIT MOD MDM: CPT

## 2021-09-21 PROCEDURE — 93005 ELECTROCARDIOGRAM TRACING: CPT

## 2021-09-21 PROCEDURE — 74011250637 HC RX REV CODE- 250/637: Performed by: EMERGENCY MEDICINE

## 2021-09-21 PROCEDURE — 71046 X-RAY EXAM CHEST 2 VIEWS: CPT

## 2021-09-21 PROCEDURE — 74011000636 HC RX REV CODE- 636: Performed by: EMERGENCY MEDICINE

## 2021-09-21 RX ORDER — KETOROLAC TROMETHAMINE 30 MG/ML
30 INJECTION, SOLUTION INTRAMUSCULAR; INTRAVENOUS
Status: COMPLETED | OUTPATIENT
Start: 2021-09-21 | End: 2021-09-21

## 2021-09-21 RX ADMIN — KETOROLAC TROMETHAMINE 30 MG: 30 INJECTION, SOLUTION INTRAMUSCULAR at 22:01

## 2021-09-21 RX ADMIN — IOPAMIDOL 100 ML: 755 INJECTION, SOLUTION INTRAVENOUS at 19:43

## 2021-09-21 RX ADMIN — LIDOCAINE HYDROCHLORIDE 40 ML: 20 SOLUTION ORAL; TOPICAL at 21:44

## 2021-09-21 NOTE — Clinical Note
Status[de-identified] INPATIENT [101]   Type of Bed: Telemetry [19]   Cardiac Monitoring Required?: Yes   Inpatient Hospitalization Certified Necessary for the Following Reasons: 3.  Patient receiving treatment that can only be provided in an inpatient setting (further clarification in H&P documentation)   Admitting Diagnosis: Troponin level elevated [[id number]]   Admitting Physician: Emmett Broussard   Attending Physician: Sasha Colon [[id number]]   Estimated Length of Stay: 2 Midnights   Discharge Plan[de-identified] Home with Office Follow-up

## 2021-09-21 NOTE — TELEPHONE ENCOUNTER
Pt's wife called saying pt has an ablation yesterday at the hospital (was intubated) and now feels like his throat is swelling and he is having difficulty breathing. She states he is able to breath through his nose but not his mouth. She states he was coughing a lot last night but is not coughing up any blood. She also states he has been on the phone a lot talking to people today. I spoke with Stiven SPRING Advised pt's wife that since he is having trouble breathing he should go to the ER to have his throat evaluated.

## 2021-09-22 ENCOUNTER — TELEPHONE (OUTPATIENT)
Dept: CARDIOLOGY CLINIC | Age: 56
End: 2021-09-22

## 2021-09-22 VITALS
BODY MASS INDEX: 34.84 KG/M2 | DIASTOLIC BLOOD PRESSURE: 73 MMHG | OXYGEN SATURATION: 95 % | RESPIRATION RATE: 24 BRPM | WEIGHT: 243.39 LBS | HEART RATE: 88 BPM | SYSTOLIC BLOOD PRESSURE: 131 MMHG | HEIGHT: 70 IN | TEMPERATURE: 98 F

## 2021-09-22 LAB
ATRIAL RATE: 80 BPM
CALCULATED P AXIS, ECG09: 36 DEGREES
CALCULATED R AXIS, ECG10: -13 DEGREES
CALCULATED T AXIS, ECG11: 20 DEGREES
DIAGNOSIS, 93000: NORMAL
P-R INTERVAL, ECG05: 166 MS
Q-T INTERVAL, ECG07: 390 MS
QRS DURATION, ECG06: 100 MS
QTC CALCULATION (BEZET), ECG08: 449 MS
VENTRICULAR RATE, ECG03: 80 BPM

## 2021-09-22 RX ORDER — ALUMINA, MAGNESIA, AND SIMETHICONE 2400; 2400; 240 MG/30ML; MG/30ML; MG/30ML
10 SUSPENSION ORAL
Qty: 335 ML | Refills: 0 | Status: SHIPPED | OUTPATIENT
Start: 2021-09-22 | End: 2022-11-04

## 2021-09-22 NOTE — TELEPHONE ENCOUNTER
Rome Owens, ANP Salvas, Claudell Chuck, LPN  Caller: Unspecified (Today, 10:42 AM)  We advised him to go to ed. It looks like they gave him gi cocktail. How is he feeling today? His troponin is elevated secondary to his procedure with Dr Deepak Whitlock. Otherwise all are ok.

## 2021-09-22 NOTE — TELEPHONE ENCOUNTER
Verified patient with 2 identifiers   Patient advised per Trixie Booker, ANP  Yes continue toprol but stop  Rythmol.  Patient verified understanding.

## 2021-09-22 NOTE — TELEPHONE ENCOUNTER
Pt had cardioablasian surgery on Monday 9/20/21 with Dr. Liliana Schulte. He was seen in the Er last night because of discomfort in his throat, they also did blood work. Pt wanted to let Dr. Liliana Schulte know, and if he should be concerned.  Callback at [phone]    Thanks   Rema Doran

## 2021-09-22 NOTE — TELEPHONE ENCOUNTER
Verified patient with 2 identifiers. Patient states he is feeling a little better  He was given lidocaine for his throat pain in the ER  Advised pain should continue to get better  Patient advised understanding.

## 2021-09-22 NOTE — ED PROVIDER NOTES
EMERGENCY DEPARTMENT HISTORY AND PHYSICAL EXAM      Date: 9/21/2021  Patient Name: Ruben Mcqueen    History of Presenting Illness     Chief Complaint   Patient presents with    Post OP Complication     pt had cardiac ablation yesterday. He had VENITA and intubation for general anesthesia. He coughed a lot last night. Today he is short of breath and feels like his throat is closing.  Shortness of Breath    Cough       History Provided By: Patient    HPI: Ruben Mcqueen, 50 y.o. male with PMHx as noted below presents the emergency department with complaints of sore throat, dyspnea, cough and chest pain. Patient reports that he had cardiac ablation done yesterday. Today he has had sore throat and feels as though his throat is swollen. Patient reports some mild dyspnea and cough as well as some right-sided chest pain. Pain is a constant pain that is worse with deep inspiration. Pain does not radiate. Pain is mild to moderate in intensity. Otherwise has had no fevers, chills or other systemic symptoms. PCP: Michelle Leija MD    Current Outpatient Medications   Medication Sig Dispense Refill    aluminum & magnesium hydroxide-simethicone (Maalox Maximum Strength) 400-400-40 mg/5 mL suspension Take 10 mL by mouth every six (6) hours as needed for Indigestion. 335 mL 0    omeprazole (PRILOSEC) 20 mg capsule Take 20 mg by mouth daily.  atorvastatin (LIPITOR) 40 mg tablet Take 1 Tablet by mouth daily. 90 Tablet 3    metoprolol succinate (TOPROL-XL) 25 mg XL tablet Take 1 Tablet by mouth daily. 90 Tablet 3    apixaban (Eliquis) 5 mg tablet Take 1 Tablet by mouth two (2) times a day. 180 Tablet 3    aspirin delayed-release 81 mg tablet Take  by mouth daily.  multivitamin (ONE A DAY) tablet Take 1 Tab by mouth daily.  glucosam-chond-hyalu-CF borate ([facility]) 750 mg-100 mg- 1.65 mg-108 mg tab Take  by mouth.       sildenafil citrate (VIAGRA) 25 mg tablet Take 20 mg by mouth as needed. Past History     Past Medical History:  Past Medical History:   Diagnosis Date    Arrhythmia     A fib    Atrial fibrillation (HCC)     Cerebral artery occlusion with cerebral infarction [facility])     Jan. 2019    GERD (gastroesophageal reflux disease)     Hyperlipidemia     Valvular heart disease        Past Surgical History:  Past Surgical History:   Procedure Laterality Date    HX HEENT      Tonsillectomy as a child       Family History:  Family History   Problem Relation Age of Onset   Byrne Stroke Mother     Heart Disease Mother     Diabetes Father     Diabetes Sister     Diabetes Brother        Social History:  Social History     Tobacco Use    Smoking status: Never Smoker    Smokeless tobacco: Never Used   Vaping Use    Vaping Use: Never used   Substance Use Topics    Alcohol use: Yes     Alcohol/week: 7.0 standard drinks     Types: 3 Glasses of wine, 3 Cans of beer, 1 Shots of liquor per week     Comment: per week    Drug use: No       Allergies: Allergies   Allergen Reactions    Penicillins Rash         Review of Systems   Review of Systems  Constitutional: Negative for fever, chills, and fatigue. HENT: Negative for congestion, sore throat, rhinorrhea, sneezing and neck stiffness   Eyes: Negative for discharge and redness. Respiratory: Positive for  shortness of breath. Negative wheezing   Cardiovascular: Positive for chest pain. Negative palpitations   Gastrointestinal: Negative for nausea, vomiting, abdominal pain, constipation, diarrhea and blood in stool. Genitourinary: Negative for dysuria, hematuria, flank pain, decreased urine volume, discharge,   Musculoskeletal: Negative for myalgias or joint pain . Skin: Negative for rash or lesions . Neurological: Negative weakness, light-headedness, numbness and headaches. Physical Exam   Physical Exam    GENERAL: alert and oriented, no acute distress  EYES: PEERL, No injection, discharge or icterus.   ENT: Mucous membranes pink and moist.  No edema of the oropharynx  NECK: Supple  LUNGS: Airway patent. Non-labored respirations. Breath sounds clear with good air entry bilaterally. HEART: Regular rate and rhythm. No peripheral edema  ABDOMEN: Non-distended and non-tender, without guarding or rebound. SKIN:  warm, dry  MSK/EXTREMITIES: Without swelling, tenderness or deformity, symmetric with normal ROM  NEUROLOGICAL: Alert, oriented      Diagnostic Study Results     Labs -     Reviewed    Radiologic Studies -   XR CHEST PA LAT   Final Result   No acute intrathoracic disease. CT NECK SOFT TISSUE W CONT   Final Result   Normal neck soft tissue CT. Specifically, the airway is patent. CT Results  (Last 48 hours)               09/21/21 1943  CT NECK SOFT TISSUE W CONT Final result    Impression:  Normal neck soft tissue CT. Specifically, the airway is patent. Narrative:  INDICATION: sob and feels like throat is closing after cardiac ablation with   general anesthesia yesterday       COMPARISON: None       TECHNIQUE:  Axial neck CT was performed after the uneventful administration of   IV contrast. Sagittal and coronal reformats were generated. CT dose reduction was achieved through use of a standardized protocol tailored   for this examination and automatic exposure control for dose modulation. CONTRAST: 100 mL Isovue 370       FINDINGS:       NASOPHARYNX: Normal.   SUPRAHYOID NECK: Normal oropharynx, oral cavity, parapharyngeal space, and   retropharyngeal space. INFRAHYOID NECK: Normal larynx, hypopharynx and supraglottis. PAROTID GLANDS: Normal.   SUBMANDIBULAR GLANDS: Normal.   THYROID GLAND: Normal. No nodule. LYMPH NODES: None enlarged by CT size criteria . LUNG APICES: Clear. OSSEOUS STRUCTURES: No destructive bone lesion. ADDITIONAL COMMENTS: Mucus in left piriform sinus.                CXR Results  (Last 48 hours)               09/21/21 2048  XR CHEST PA LAT Final result Impression:  No acute intrathoracic disease. Narrative:  Clinical history: sob   INDICATION:   sob   COMPARISON: 9/16/2021       FINDINGS:    PA and lateral views of the chest are obtained. The cardiopericardial silhouette is able, mildly prominent. There is no pleural   effusion, pneumothorax or focal consolidation present. Medical Decision Making     ICeline MD am the first provider for this patient and am the attending of record for this patient encounter. I reviewed the vital signs, available nursing notes, past medical history, past surgical history, family history and social history. Vital Signs-Reviewed the patient's vital signs. No data found. Pulse Oximetry Analysis - 96% on RA    Cardiac Monitor:   Rate: 80 bpm  Rhythm: Normal Sinus Rhythm    The cardiac monitor was ordered secondary to chest pain  and to monitor the patient for dysrhythmia    Cardiac Monitor:   Rate: 78 bpm  Rhythm: Normal Sinus Rhythm    The cardiac monitor was ordered secondary to chest pain  and to monitor the patient for dysrhythmia    EKG interpretation: (Preliminary)  Rhythm: normal sinus rhythm; and regular . Rate (approx.): 80; Axis: normal; P wave: normal; QRS interval: normal ; ST/T wave: Nonspecific changes;  was interpreted by Emre Shen MD,ED Provider. Records Reviewed: Nursing Notes and Old Medical Records    Provider Notes (Medical Decision Making): On presentation, the patient is well appearing, in no acute distress with normal vital signs. Based on my history and exam the differential diagnosis for this patient includes cardiac injury, ACS, esophageal injury, aspiration pneumonia among others. Imaging of the throat showed no acute findings or signs of airway compromise. Chest x-ray showed no acute findings on my interpretation.   Labs were notable for elevated troponin which is to be expected after procedure however unclear as to whether or not the extent of this elevation would warrant further observations we will plan to consult with cardiology. Patient was given viscous lidocaine as well as Toradol with near resolution in his symptoms. ED Course:   Initial assessment performed. The patients presenting problems have been discussed, and they are in agreement with the care plan formulated and outlined with them. I have encouraged them to ask questions as they arise throughout their visit. Medications   iopamidoL (ISOVUE-370) 76 % injection 100 mL (100 mL IntraVENous Given 9/21/21 1943)   maalox/viscous lidocaine (COV GI COCKTAIL) (40 mL Oral Given 9/21/21 2144)   ketorolac (TORADOL) injection 30 mg (30 mg IntraMUSCular Given 9/21/21 2201)     Consult: Discussed care with Dr. Meghan Kapoor, cardiology who recommended trending troponins and admission to the hospital for observation overnight. PROGRESS:  I informed the pt that he needed further observation for adequate evaluation for his chest pain and Elevated troponin  and that by refusing the above, he is at risk for myocardial infarction, arrythmia, sudden death, further deterioration and coma  He is awake, alert, and he understands his condition and the risks involved in leaving. He is clinically aware of his surroundings and able to ask appropriate questions, the patients spouse and the nurse present confirms he is not clinically intoxicated and able to make medical decisions. He verbalized knowing the risks and understood it was recommended that he stay and could also return at any time. The patient's spouse was present for the discussion and also verbalized that they understood both diagnosis, risks and could return at any time. They were both provided with warnings regarding worsening of his condition and were provided instructions to follow up with Ryanne Jack MD tomorrow or return to the Emergency Room as soon as possible.  This discussion was witnessed by his nurse  Mikey Meeks Mykel Zavala MD        Disposition:  home    PLAN:  1. Discharge Medication List as of 9/22/2021 12:21 AM      CONTINUE these medications which have NOT CHANGED    Details   omeprazole (PRILOSEC) 20 mg capsule Take 20 mg by mouth daily. , Historical Med      atorvastatin (LIPITOR) 40 mg tablet Take 1 Tablet by mouth daily. , Normal, Disp-90 Tablet, R-3FUTURE REFILLS AFTER APPT IN 7/2021      metoprolol succinate (TOPROL-XL) 25 mg XL tablet Take 1 Tablet by mouth daily. , Normal, Disp-90 Tablet, R-3      apixaban (Eliquis) 5 mg tablet Take 1 Tablet by mouth two (2) times a day., Normal, Disp-180 Tablet, R-3FUTURE REFILLS AFTER APPT IN 7/2021      aspirin delayed-release 81 mg tablet Take  by mouth daily. , Historical Med      multivitamin (ONE A DAY) tablet Take 1 Tab by mouth daily. , Historical Med      glucosam-chond-hyalu-CF borate ([facility]) 750 mg-100 mg- 1.65 mg-108 mg tab Take  by mouth., Historical Med      sildenafil citrate (VIAGRA) 25 mg tablet Take 20 mg by mouth as needed., Historical Med           2. Follow-up Information     Follow up With Specialties Details Why Contact Info    Suma Lombardi MD Family Medicine Schedule an appointment as soon as possible for a visit in 2 days  [address] [place] [address]  [address] [address] [phone]      [facility] [address] [facility] DEPT Emergency Medicine  If symptoms worsen [address] [facility] 31    Jos Villalta MD Cardiology, [address] [facility] Vascular Surgery, Internal Medicine   [address] [place] [address]  [address] [address] [phone]          Return to ED if worse     Diagnosis     Clinical Impression:   1. Chest pain, unspecified type    2. Elevated troponin    3. Dyspnea, unspecified type        Please note that this dictation was completed with Dragon, computer voice recognition software.   Quite often unanticipated grammatical, syntax, homophones, and other interpretive errors are inadvertently transcribed by the computer software. Please disregard these errors. Additionally, please excuse any errors that have escaped final proofreading.

## 2021-09-22 NOTE — TELEPHONE ENCOUNTER
Cody Mcdowell, ANP Salvas, Marisela Schaumann, LPN  Caller: Unspecified (Today, 10:42 AM)  We advised him to go to ed. It looks like they gave him gi cocktail. How is he feeling today? His troponin is elevated secondary to his procedure with Dr Aliza Interiano. Otherwise all are ok.

## 2021-09-22 NOTE — TELEPHONE ENCOUNTER
Patient questioning medication post ablation  He believes he was advised to continue with metoprolol but discontinue the Rythmol? Please advise.

## 2021-09-22 NOTE — PROGRESS NOTES
3391: called ED to receive report on pt, was informed nurse was busy. Left callback # to return call when nurse gets a moment.

## 2021-10-07 ENCOUNTER — OFFICE VISIT (OUTPATIENT)
Dept: CARDIOLOGY CLINIC | Age: 56
End: 2021-10-07
Payer: COMMERCIAL

## 2021-10-07 VITALS
WEIGHT: 235.4 LBS | OXYGEN SATURATION: 99 % | HEIGHT: 70 IN | SYSTOLIC BLOOD PRESSURE: 118 MMHG | RESPIRATION RATE: 18 BRPM | DIASTOLIC BLOOD PRESSURE: 76 MMHG | BODY MASS INDEX: 33.7 KG/M2 | HEART RATE: 77 BPM

## 2021-10-07 DIAGNOSIS — I48.0 PAROXYSMAL ATRIAL FIBRILLATION (HCC): Primary | ICD-10-CM

## 2021-10-07 DIAGNOSIS — E78.2 MIXED HYPERLIPIDEMIA: ICD-10-CM

## 2021-10-07 DIAGNOSIS — I48.3 TYPICAL ATRIAL FLUTTER (HCC): ICD-10-CM

## 2021-10-07 PROCEDURE — 93000 ELECTROCARDIOGRAM COMPLETE: CPT | Performed by: INTERNAL MEDICINE

## 2021-10-07 PROCEDURE — 99214 OFFICE O/P EST MOD 30 MIN: CPT | Performed by: INTERNAL MEDICINE

## 2021-10-07 NOTE — LETTER
10/7/2021    Patient: Philly Garcia   YOB: 1965   Date of Visit: 10/7/2021     Aroldo Tobias MD  [address] [place] [address]  [address] [address]  Via Fax: [phone]    Dear Aroldo Tobias MD,      Thank you for referring Mr. Eather Cranker to [address] [place] [address] for evaluation. My notes for this consultation are attached. If you have questions, please do not hesitate to call me. I look forward to following your patient along with you.       Sincerely,    Asher Simpson MD

## 2021-10-07 NOTE — PROGRESS NOTES
Subjective:      Trent Khalil is a 50 y.o. male is here for EP consult. Sp AF abl. The patient denies chest pain/ shortness of breath, orthopnea, PND, LE edema, palpitations, syncope, presyncope or fatigue. Patient Active Problem List    Diagnosis Date Noted    Troponin level elevated 09/21/2021    Atrial fibrillation ([address] 09/20/2021    Paroxysmal atrial fibrillation ([address] 07/19/2021    Nonrheumatic mitral valve regurgitation 07/19/2021    Mixed hyperlipidemia 07/19/2021    History of CVA (cerebrovascular accident) 04/04/2019      Nathalia Hernandez MD  Past Medical History:   Diagnosis Date    Arrhythmia     A fib    Atrial fibrillation ([address]     Cerebral artery occlusion with cerebral infarction [facility])     Jan. 2019    GERD (gastroesophageal reflux disease)     Hyperlipidemia     Valvular heart disease       Past Surgical History:   Procedure Laterality Date    HX HEENT      Tonsillectomy as a child     Allergies   Allergen Reactions    Penicillins Rash      Family History   Problem Relation Age of Onset   Bertell Halim Stroke Mother     Heart Disease Mother     Diabetes Father     Diabetes Sister     Diabetes Brother     negative for cardiac disease  Social History     Socioeconomic History    Marital status:      Spouse name: Not on file    Number of children: Not on file    Years of education: Not on file    Highest education level: Not on file   Tobacco Use    Smoking status: Never Smoker    Smokeless tobacco: Never Used   Vaping Use    Vaping Use: Never used   Substance and Sexual Activity    Alcohol use:  Yes     Alcohol/week: 7.0 standard drinks     Types: 3 Glasses of wine, 3 Cans of beer, 1 Shots of liquor per week     Comment: per week    Drug use: No     Social Determinants of Health     Financial Resource Strain:     Difficulty of Paying Living Expenses:    Food Insecurity:     Worried About Running Out of Food in the Last Year:     [address] in the Last Year:    Transportation Needs:     Lack of Transportation (Medical):  Lack of Transportation (Non-Medical):    Physical Activity:     Days of Exercise per Week:     Minutes of Exercise per Session:    Stress:     Feeling of Stress :    Social Connections:     Frequency of Communication with Friends and Family:     Frequency of Social Gatherings with Friends and Family:     Attends Bahai Services:     Active Member of Clubs or Organizations:     Attends Club or Organization Meetings:     Marital Status:      Current Outpatient Medications   Medication Sig    aluminum & magnesium hydroxide-simethicone (Maalox Maximum Strength) 400-400-40 mg/5 mL suspension Take 10 mL by mouth every six (6) hours as needed for Indigestion.  omeprazole (PRILOSEC) 20 mg capsule Take 20 mg by mouth daily.  atorvastatin (LIPITOR) 40 mg tablet Take 1 Tablet by mouth daily.  apixaban (Eliquis) 5 mg tablet Take 1 Tablet by mouth two (2) times a day.  aspirin delayed-release 81 mg tablet Take  by mouth daily.  multivitamin (ONE A DAY) tablet Take 1 Tab by mouth daily.  glucosam-chond-hyalu-CF borate ([facility]) 750 mg-100 mg- 1.65 mg-108 mg tab Take  by mouth.  sildenafil citrate (VIAGRA) 25 mg tablet Take 20 mg by mouth as needed. No current facility-administered medications for this visit. Vitals:    10/07/21 0930   BP: 118/76   Pulse: 77   Resp: 18   SpO2: 99%   Weight: 235 lb 6.4 oz (106.8 kg)   Height: 5' 10\" (1.778 m)       I have reviewed the nurses notes, vitals, problem list, allergy list, medical history, family, social history and medications. Review of Symptoms:    General: Pt denies excessive weight gain or loss. Pt is able to conduct ADL's  HEENT: Denies blurred vision, headaches, hearing loss, epistaxis and difficulty swallowing. Respiratory: Denies cough, congestion, shortness of breath, PINK, wheezing or stridor.   Cardiovascular: Denies precordial pain, palpitations, edema or PND  Gastrointestinal: Denies poor appetite, indigestion, abdominal pain or blood in stool  Genitourinary: Denies hematuria, dysuria, increased urinary frequency  Musculoskeletal: Denies joint pain or swelling from muscles or joints  Neurologic: Denies tremor, paresthesias, headache, or sensory motor disturbance  Psychiatric: Denies confusion, insomnia, depression  Integumentray: Denies rash, itching or ulcers. Hematologic: Denies easy bruising, bleeding    Physical Exam:      General: Well developed, in no acute distress. HEENT: Eyes - PERRL, no jvd  Heart:  Normal S1/S2 negative S3 or S4. Regular, no murmur, gallop or rub. Respiratory: Clear bilaterally x 4, no wheezing or rales  Abdomen:   Soft, non-tender, bowel sounds are active. Extremities:  No edema, normal cap refill, no cyanosis. Musculoskeletal: No clubbing  Neuro: A&Ox3, speech clear, gait stable. Skin: Skin color is normal. No rashes or lesions.  Non diaphoretic, no ulcers or subcutaneous nodule  Vascular: 2+ pulses symmetric in all extremities  Psych - judgement intact and orientation is wnl     Cardiographics    Ekg: nsr    Results for orders placed or performed during the hospital encounter of 09/21/21   EKG, 12 LEAD, INITIAL   Result Value Ref Range    Ventricular Rate 80 BPM    Atrial Rate 80 BPM    P-R Interval 166 ms    QRS Duration 100 ms    Q-T Interval 390 ms    QTC Calculation (Bezet) 449 ms    Calculated P Axis 36 degrees    Calculated R Axis -13 degrees    Calculated T Axis 20 degrees    Diagnosis       Normal sinus rhythm  Incomplete right bundle branch block  When compared with ECG of 30-JUL-2021 09:55,  No significant change  Confirmed by Manisha Beatty ([id number]) on 9/22/2021 11:36:56 AM           Lab Results   Component Value Date/Time    WBC 13.9 (H) 09/21/2021 06:20 PM    HGB 13.9 09/21/2021 06:20 PM    HCT 42.9 09/21/2021 06:20 PM    PLATELET 548 24/49/0347 06:20 PM    MCV 97.5 09/21/2021 06:20 PM Lab Results   Component Value Date/Time    Sodium 136 09/21/2021 06:20 PM    Potassium 4.0 09/21/2021 06:20 PM    Chloride 105 09/21/2021 06:20 PM    CO2 30 09/21/2021 06:20 PM    Anion gap 1 (L) 09/21/2021 06:20 PM    Glucose 119 (H) 09/21/2021 06:20 PM    BUN 20 09/21/2021 06:20 PM    Creatinine 0.94 09/21/2021 06:20 PM    BUN/Creatinine ratio 21 (H) 09/21/2021 06:20 PM    GFR est AA >60 09/21/2021 06:20 PM    GFR est non-AA >60 09/21/2021 06:20 PM    Calcium 8.9 09/21/2021 06:20 PM    Bilirubin, total 0.4 09/13/2021 04:16 PM    Alk. phosphatase 72 09/13/2021 04:16 PM    Protein, total 7.2 09/13/2021 04:16 PM    Albumin 4.5 09/13/2021 04:16 PM    A-G Ratio 1.7 09/13/2021 04:16 PM    ALT (SGPT) 95 (H) 09/13/2021 04:16 PM         Assessment:     Assessment:        ICD-10-CM ICD-9-CM    1. Paroxysmal atrial fibrillation (HCC)  I48.0 427.31 AMB POC EKG ROUTINE W/ 12 LEADS, INTER & REP   2. Mixed hyperlipidemia  E78.2 272.2    3. Typical atrial flutter (HCC)  I48.3 427.32      Orders Placed This Encounter    AMB POC EKG ROUTINE W/ 12 LEADS, INTER & REP     Order Specific Question:   Reason for Exam:     Answer:   Routine        Plan:   Chiara Palmer is sp AF ablation. He is in sinus and asymptomatic - no palpitations. He some sob at times and I will stop his toprol. He will cont med rx for hyperlipidemia and f/u in 3 months. At that time, we will dc his oac (chads vasc 0). Encouraged to diet and exercise to combat obesity - he states that is working on getting back to diet plan/meal prep. Thank you for allowing me to participate in Chiara Palmer 's care. Campos Sauer MD, [facility]    On this date 10/07/2021 I have spent 30 minutes reviewing previous notes, test results and face to face with the patient discussing the diagnosis and importance of compliance with the treatment plan as well as documenting on the day of the visit.

## 2021-10-07 NOTE — PROGRESS NOTES
Chief Complaint   Patient presents with    Irregular Heart Beat     Afib/Flutter S/p Ablation 09/20/21; c/o shortness of breath improvement since ablation     Visit Vitals  /76 (BP 1 Location: Right arm, BP Patient Position: Sitting, BP Cuff Size: Large adult)   Pulse 77   Resp 18   Ht 5' 10\" (1.778 m)   Wt 235 lb 6.4 oz (106.8 kg)   SpO2 99%   BMI 33.78 kg/m²       1. Have you been to the ER, urgent care clinic since your last visit? Hospitalized since your last visit? Yes When: 09/21/2021 [facility] Elevated Troponin Level    2. Have you seen or consulted any other health care providers outside of the [address] [place] [address] since your last visit? Include any pap smears or colon screening.  No

## 2022-03-18 PROBLEM — R77.8 TROPONIN LEVEL ELEVATED: Status: ACTIVE | Noted: 2021-09-21

## 2022-03-18 PROBLEM — R79.89 TROPONIN LEVEL ELEVATED: Status: ACTIVE | Noted: 2021-09-21

## 2022-03-19 PROBLEM — E78.2 MIXED HYPERLIPIDEMIA: Status: ACTIVE | Noted: 2021-07-19

## 2022-03-19 PROBLEM — I48.91 ATRIAL FIBRILLATION (HCC): Status: ACTIVE | Noted: 2021-09-20

## 2022-03-19 PROBLEM — Z86.73 HISTORY OF CVA (CEREBROVASCULAR ACCIDENT): Status: ACTIVE | Noted: 2019-04-04

## 2022-03-19 PROBLEM — I34.0 NONRHEUMATIC MITRAL VALVE REGURGITATION: Status: ACTIVE | Noted: 2021-07-19

## 2022-03-19 PROBLEM — I48.0 PAROXYSMAL ATRIAL FIBRILLATION (HCC): Status: ACTIVE | Noted: 2021-07-19

## 2022-11-04 ENCOUNTER — OFFICE VISIT (OUTPATIENT)
Dept: CARDIOLOGY CLINIC | Age: 57
End: 2022-11-04
Payer: COMMERCIAL

## 2022-11-04 ENCOUNTER — TELEPHONE (OUTPATIENT)
Dept: CARDIOLOGY CLINIC | Age: 57
End: 2022-11-04

## 2022-11-04 VITALS
SYSTOLIC BLOOD PRESSURE: 136 MMHG | RESPIRATION RATE: 15 BRPM | WEIGHT: 242.6 LBS | HEIGHT: 70 IN | DIASTOLIC BLOOD PRESSURE: 100 MMHG | HEART RATE: 78 BPM | OXYGEN SATURATION: 99 % | BODY MASS INDEX: 34.73 KG/M2

## 2022-11-04 DIAGNOSIS — I48.0 PAROXYSMAL ATRIAL FIBRILLATION (HCC): Primary | ICD-10-CM

## 2022-11-04 DIAGNOSIS — E78.2 MIXED HYPERLIPIDEMIA: ICD-10-CM

## 2022-11-04 DIAGNOSIS — I48.3 TYPICAL ATRIAL FLUTTER (HCC): ICD-10-CM

## 2022-11-04 PROCEDURE — 99214 OFFICE O/P EST MOD 30 MIN: CPT | Performed by: SPECIALIST

## 2022-11-04 PROCEDURE — 93000 ELECTROCARDIOGRAM COMPLETE: CPT | Performed by: SPECIALIST

## 2022-11-04 RX ORDER — METOPROLOL SUCCINATE 50 MG/1
50 TABLET, EXTENDED RELEASE ORAL DAILY
Qty: 90 TABLET | Refills: 3 | Status: SHIPPED | OUTPATIENT
Start: 2022-11-04

## 2022-11-04 RX ORDER — CITALOPRAM 10 MG/1
1 TABLET ORAL DAILY
COMMUNITY
Start: 2022-10-30

## 2022-11-04 RX ORDER — FLUTICASONE PROPIONATE 50 MCG
2 SPRAY, SUSPENSION (ML) NASAL DAILY
COMMUNITY
Start: 2022-08-19

## 2022-11-04 RX ORDER — MONTELUKAST SODIUM 10 MG/1
1 TABLET ORAL DAILY
COMMUNITY
Start: 2022-08-01

## 2022-11-04 NOTE — PROGRESS NOTES
Visit Vitals  BP (!) 140/100 (BP 1 Location: Left arm, BP Patient Position: Sitting, BP Cuff Size: Adult)   Pulse 78   Resp 15   Ht 5' 10\" (1.778 m)   Wt 242 lb 9.6 oz (110 kg)   SpO2 99%   BMI 34.81 kg/m²

## 2022-11-04 NOTE — TELEPHONE ENCOUNTER
Enrolled with [employer] - Ordered and being shipped to patient's home address on file. ETA within 5-7 business days. Message  Received: Today  Kwame Woodruff, RN  Isaura Drummond  Please send a 14 day loop monitor for palps. . Thank you!   This is a Alan patient

## 2022-11-04 NOTE — PROGRESS NOTES
[facility]                                                            OFFICE NOTE        Carlos A Martínez M.D.,MEME            Marta Mares [address] [place] [address]   1965  [id number]    Date/Time:  11/4/202210:59 AM            SUBJECTIVE:        In the last several weeks has been experiencing shortness of breath with activity occasional chest pressure with and without activities and palpitations. He is not sure if stress is causing most of the problems. He is gaining significant amount of weight he tells me recently. Assessment/Plan    1. Chest discomfort: This has typical and atypical features. Electrocardiogram reveals a normal sinus rhythm with inferior Q waves unclear significance. RSR prime in V1 also noted. Discussed options proceed with exercise treadmill Myoview. 2.  Palpitations: He seems to be in normal sinus rhythm at this time. Proceed with event monitor 2 weeks for potential detection of atrial fibrillation. 3.  Atrial fibrillation: Paroxysmal.  Continue Eliquis. 4.  Hypertension: He remains hypertensive today after 3 different checks. This may be in part related to stress but this needs to be addressed today. Start Toprol-XL 50 mg daily. He has taken this in the past and has done well with that. Blood pressure log for 2 weeks afterwards. We will consider echocardiogram at next office visit as well as well. Is aware of nutrition and exercise. Seen back after nuclear stress test have been completed          HPI   Very pleasant 49years old male with the past medical history remarkable for previous CVA in 2019 with right-sided hemiparesis underwent tPA treatment at [facility] with complete resolution of symptoms. Later on it was felt that the CVA was related to paroxysmal atrial fibrillation. He has undergone cardioversion x1 unsuccessfully.     He has then undergone atrial fibrillation ablation in 2021 with Dr. Kira Gillespie. He presents today for follow-up on account of recurrence of palpitation and shortness of breath or chest discomfort. Past medical history: As above hyperlipidemia gastroesophageal reflux disease and anxiety depression. Surgical history: Atrial fibrillation ablation. 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 He is on FMLA for excessive stress at this time. Family history: He does have a strong family history for coronary events in early age. CARDIAC STUDIES        07/30/21    ECHO VENITA W POSSIBLE CARDIOVERSION 07/30/2021 7/30/2021    Interpretation Summary  · Saline contrast was given to evaluate for intracardiac shunt. · LA: Mildly dilated left atrium. Left atrial appendage velocity is normal (greater than 40 cm/sec). · LV: Estimated LVEF is 55 - 60%. Normal cavity size, wall thickness, systolic function (ejection fraction normal) and diastolic function. Wall motion: normal. Normal left ventricular strain. · MV: Mild mitral valve regurgitation is present. · IAS: No atrial septal defect present. Signed by: Kevin Zhang MD on 7/30/2021  2:00 PM                              EKG Results       Procedure 720 Value Units Date/Time    AMB POC EKG ROUTINE W/ 12 LEADS, INTER & REP [[id number]] Resulted: 11/04/22 0953    Order Status: Completed Updated: 11/04/22 0954                IMAGING      MRI Results (most recent):  No results found for this or any previous visit. CT Results (most recent):  Results from Hospital Encounter encounter on 09/21/21    CT NECK SOFT TISSUE W CONT    Narrative  INDICATION: sob and feels like throat is closing after cardiac ablation with  general anesthesia yesterday    COMPARISON: None    TECHNIQUE:  Axial neck CT was performed after the uneventful administration of  IV contrast. Sagittal and coronal reformats were generated.     CT dose reduction was achieved through use of a standardized protocol tailored  for this examination and automatic exposure control for dose modulation. CONTRAST: 100 mL Isovue 370    FINDINGS:    NASOPHARYNX: Normal.  SUPRAHYOID NECK: Normal oropharynx, oral cavity, parapharyngeal space, and  retropharyngeal space. INFRAHYOID NECK: Normal larynx, hypopharynx and supraglottis. PAROTID GLANDS: Normal.  SUBMANDIBULAR GLANDS: Normal.  THYROID GLAND: Normal. No nodule. LYMPH NODES: None enlarged by CT size criteria . LUNG APICES: Clear. OSSEOUS STRUCTURES: No destructive bone lesion. ADDITIONAL COMMENTS: Mucus in left piriform sinus. Impression  Normal neck soft tissue CT. Specifically, the airway is patent. XR Results (most recent):  Results from Hospital Encounter encounter on 09/21/21    XR CHEST PA LAT    Narrative  Clinical history: sob  INDICATION:   sob  COMPARISON: 9/16/2021    FINDINGS:  PA and lateral views of the chest are obtained. The cardiopericardial silhouette is able, mildly prominent. There is no pleural  effusion, pneumothorax or focal consolidation present. Impression  No acute intrathoracic disease. Past Medical History:   Diagnosis Date    Arrhythmia     A fib    Atrial fibrillation ([address]     Cerebral artery occlusion with cerebral infarction [facility])     Jan. 2019    GERD (gastroesophageal reflux disease)     Hyperlipidemia     Valvular heart disease      Past Surgical History:   Procedure Laterality Date    HX HEENT      Tonsillectomy as a child     Social History     Tobacco Use    Smoking status: Never    Smokeless tobacco: Never   Vaping Use    Vaping Use: Never used   Substance Use Topics    Alcohol use:  Yes     Alcohol/week: 7.0 standard drinks     Types: 3 Glasses of wine, 3 Cans of beer, 1 Shots of liquor per week     Comment: per week    Drug use: No     Family History   Problem Relation Age of Onset    Stroke Mother     Heart Disease Mother     Diabetes Father Diabetes Sister     Diabetes Brother      Allergies   Allergen Reactions    Penicillins Rash         Visit Vitals  BP (!) 136/100   Pulse 78   Resp 15   Ht 5' 10\" (1.778 m)   Wt 242 lb 9.6 oz (110 kg)   SpO2 99%   BMI 34.81 kg/m²         Last 3 Recorded Weights in this Encounter    11/04/22 0949   Weight: 242 lb 9.6 oz (110 kg)            Review of Systems:   Pertinent items are noted in the History of Present Illness. Visit Vitals  BP (!) 136/100   Pulse 78   Resp 15   Ht 5' 10\" (1.778 m)   Wt 242 lb 9.6 oz (110 kg)   SpO2 99%   BMI 34.81 kg/m²     General Appearance:  Well developed, well nourished,alert and oriented x 3, and individual in no acute distress. Ears/Nose/Mouth/Throat:   Hearing grossly normal.         Neck: Supple. Chest:   Lungs clear to auscultation bilaterally. Cardiovascular:  Regular rate and rhythm, S1, S2 normal, no murmur. Abdomen:   Soft, non-tender, bowel sounds are active. Extremities: No edema bilaterally. Skin: Warm and dry. Current Outpatient Medications on File Prior to Visit   Medication Sig Dispense Refill    citalopram (CELEXA) 10 mg tablet Take 1 Tablet by mouth daily. montelukast (SINGULAIR) 10 mg tablet Take 1 Tablet by mouth daily. fluticasone propionate (FLONASE) 50 mcg/actuation nasal spray 2 Sprays by Both Nostrils route daily. omeprazole (PRILOSEC) 20 mg capsule Take 20 mg by mouth daily. atorvastatin (LIPITOR) 40 mg tablet Take 1 Tablet by mouth daily. 90 Tablet 3    apixaban (Eliquis) 5 mg tablet Take 1 Tablet by mouth two (2) times a day. 180 Tablet 3    aspirin delayed-release 81 mg tablet Take  by mouth daily. multivitamin (ONE A DAY) tablet Take 1 Tab by mouth daily. glucosam-chond-hyalu-CF borate 750 mg-100 mg- 1.65 mg-108 mg tab Take  by mouth.      sildenafil citrate (VIAGRA) 25 mg tablet Take 20 mg by mouth as needed.       aluminum & magnesium hydroxide-simethicone (Maalox Maximum Strength) 400-400-40 mg/5 mL suspension Take 10 mL by mouth every six (6) hours as needed for Indigestion. (Patient not taking: Reported on 11/4/2022) 335 mL 0     No current facility-administered medications on file prior to visit. Jose Ramon Presley had no medications administered during this visit. Current Outpatient Medications   Medication Sig    citalopram (CELEXA) 10 mg tablet Take 1 Tablet by mouth daily. montelukast (SINGULAIR) 10 mg tablet Take 1 Tablet by mouth daily. fluticasone propionate (FLONASE) 50 mcg/actuation nasal spray 2 Sprays by Both Nostrils route daily. omeprazole (PRILOSEC) 20 mg capsule Take 20 mg by mouth daily. atorvastatin (LIPITOR) 40 mg tablet Take 1 Tablet by mouth daily. apixaban (Eliquis) 5 mg tablet Take 1 Tablet by mouth two (2) times a day. aspirin delayed-release 81 mg tablet Take  by mouth daily. multivitamin (ONE A DAY) tablet Take 1 Tab by mouth daily. glucosam-chond-hyalu-CF borate 750 mg-100 mg- 1.65 mg-108 mg tab Take  by mouth.    sildenafil citrate (VIAGRA) 25 mg tablet Take 20 mg by mouth as needed. aluminum & magnesium hydroxide-simethicone (Maalox Maximum Strength) 400-400-40 mg/5 mL suspension Take 10 mL by mouth every six (6) hours as needed for Indigestion. (Patient not taking: Reported on 11/4/2022)     No current facility-administered medications for this visit.          Lab Results   Component Value Date/Time    Cholesterol, total 242 (H) 07/23/2021 07:53 AM    HDL Cholesterol 55 07/23/2021 07:53 AM    LDL, calculated 157 (H) 07/23/2021 07:53 AM    VLDL, calculated 30 07/23/2021 07:53 AM    Triglyceride 168 (H) 07/23/2021 07:53 AM       Lab Results   Component Value Date/Time    Sodium 136 09/21/2021 06:20 PM    Potassium 4.0 09/21/2021 06:20 PM    Chloride 105 09/21/2021 06:20 PM    CO2 30 09/21/2021 06:20 PM    Anion gap 1 (L) 09/21/2021 06:20 PM    Glucose 119 (H) 09/21/2021 06:20 PM    BUN 20 09/21/2021 06:20 PM    Creatinine 0.94 09/21/2021 06:20 PM    BUN/Creatinine ratio 21 (H) 09/21/2021 06:20 PM    GFR est AA >60 09/21/2021 06:20 PM    GFR est non-AA >60 09/21/2021 06:20 PM    Calcium 8.9 09/21/2021 06:20 PM       Lab Results   Component Value Date/Time    ALT (SGPT) 95 (H) 09/13/2021 04:16 PM    Alk. phosphatase 72 09/13/2021 04:16 PM    Bilirubin, total 0.4 09/13/2021 04:16 PM       Lab Results   Component Value Date/Time    WBC 13.9 (H) 09/21/2021 06:20 PM    HGB 13.9 09/21/2021 06:20 PM    HCT 42.9 09/21/2021 06:20 PM    PLATELET 708 07/14/9318 06:20 PM    MCV 97.5 09/21/2021 06:20 PM       No results found for: TSH, TSH2, TSH3, TSHP, TSHEXT      Lab Results   Component Value Date/Time    Cholesterol, total 242 (H) 07/23/2021 07:53 AM    HDL Cholesterol 55 07/23/2021 07:53 AM    LDL, calculated 157 (H) 07/23/2021 07:53 AM    Triglyceride 168 (H) 07/23/2021 07:53 AM                Please note that this dictation was completed with [employer], the computer voice recognition software. Quite often unanticipated grammatical, syntax, homophones, and other interpretative errors are inadvertently transcribed by the computer software. Please disregard these errors. Please excuse any errors that have escaped final proofreading.

## 2022-11-04 NOTE — PATIENT INSTRUCTIONS
Please buy a blood pressure cuff and begin to take your blood pressures daily for 2 weeks and send those readings to us via my chart or call our office. Please call [phone] to schedule your stress test and please do not take your Toprol 24 hours prior to that appointment.

## 2022-12-16 ENCOUNTER — TELEPHONE (OUTPATIENT)
Dept: CARDIOLOGY CLINIC | Age: 57
End: 2022-12-16

## 2022-12-16 NOTE — TELEPHONE ENCOUNTER
MD Jillian Pratt, RN  Monitor ok mostly nsr     Two patient identifiers verified. Per MD patient called and given results. . Patient verbalized understanding and denies any further questions or concerns at this time. ;t    No future appointments.

## 2022-12-22 ENCOUNTER — TELEPHONE (OUTPATIENT)
Dept: CARDIOLOGY CLINIC | Age: 57
End: 2022-12-22

## 2022-12-22 NOTE — TELEPHONE ENCOUNTER
Holter report received and sent to scanning.  Results discussed via phone call with patient 12/16/2022

## 2023-05-17 RX ORDER — CITALOPRAM 10 MG/1
1 TABLET ORAL DAILY
COMMUNITY
Start: 2022-10-30

## 2023-05-17 RX ORDER — MONTELUKAST SODIUM 10 MG/1
1 TABLET ORAL DAILY
COMMUNITY
Start: 2022-08-01

## 2023-05-17 RX ORDER — OMEPRAZOLE 20 MG/1
20 CAPSULE, DELAYED RELEASE ORAL DAILY
COMMUNITY

## 2023-05-17 RX ORDER — METOPROLOL SUCCINATE 50 MG/1
50 TABLET, EXTENDED RELEASE ORAL DAILY
COMMUNITY
Start: 2022-11-04

## 2023-05-17 RX ORDER — FLUTICASONE PROPIONATE 50 MCG
2 SPRAY, SUSPENSION (ML) NASAL DAILY
COMMUNITY
Start: 2022-08-19

## 2023-05-17 RX ORDER — ASPIRIN 81 MG/1
TABLET ORAL DAILY
COMMUNITY

## 2023-05-17 RX ORDER — SILDENAFIL 25 MG/1
20 TABLET, FILM COATED ORAL PRN
COMMUNITY

## 2023-05-17 RX ORDER — ATORVASTATIN CALCIUM 40 MG/1
40 TABLET, FILM COATED ORAL DAILY
COMMUNITY
Start: 2021-07-30

## 2023-11-24 ENCOUNTER — HOSPITAL ENCOUNTER (EMERGENCY)
Facility: HOSPITAL | Age: 58
Discharge: HOME | End: 2023-11-24
Attending: EMERGENCY MEDICINE
Payer: COMMERCIAL

## 2023-11-24 ENCOUNTER — APPOINTMENT (OUTPATIENT)
Facility: HOSPITAL | Age: 58
End: 2023-11-24
Payer: COMMERCIAL

## 2023-11-24 VITALS
RESPIRATION RATE: 15 BRPM | BODY MASS INDEX: 32.88 KG/M2 | OXYGEN SATURATION: 94 % | WEIGHT: 222 LBS | DIASTOLIC BLOOD PRESSURE: 72 MMHG | TEMPERATURE: 97.3 F | HEART RATE: 91 BPM | HEIGHT: 69 IN | SYSTOLIC BLOOD PRESSURE: 111 MMHG

## 2023-11-24 DIAGNOSIS — R06.02 SHORTNESS OF BREATH: ICD-10-CM

## 2023-11-24 DIAGNOSIS — R07.9 ACUTE CHEST PAIN: Primary | ICD-10-CM

## 2023-11-24 DIAGNOSIS — F41.0 PANIC ATTACK: ICD-10-CM

## 2023-11-24 DIAGNOSIS — R06.4 HYPERVENTILATION: ICD-10-CM

## 2023-11-24 LAB
ALBUMIN SERPL-MCNC: 3.9 G/DL (ref 3.5–5)
ALBUMIN/GLOB SERPL: 1.1 (ref 1.1–2.2)
ALP SERPL-CCNC: 77 U/L (ref 45–117)
ALT SERPL-CCNC: 61 U/L (ref 12–78)
ANION GAP SERPL CALC-SCNC: 11 MMOL/L (ref 5–15)
AST SERPL-CCNC: 65 U/L (ref 15–37)
BASOPHILS # BLD: 0.1 K/UL (ref 0–0.1)
BASOPHILS NFR BLD: 1 % (ref 0–1)
BILIRUB SERPL-MCNC: 0.4 MG/DL (ref 0.2–1)
BUN SERPL-MCNC: 19 MG/DL (ref 6–20)
BUN/CREAT SERPL: 18 (ref 12–20)
CALCIUM SERPL-MCNC: 9 MG/DL (ref 8.5–10.1)
CHLORIDE SERPL-SCNC: 107 MMOL/L (ref 97–108)
CO2 SERPL-SCNC: 22 MMOL/L (ref 21–32)
CREAT SERPL-MCNC: 1.06 MG/DL (ref 0.7–1.3)
DIFFERENTIAL METHOD BLD: NORMAL
EOSINOPHIL # BLD: 0.1 K/UL (ref 0–0.4)
EOSINOPHIL NFR BLD: 1 % (ref 0–7)
ERYTHROCYTE [DISTWIDTH] IN BLOOD BY AUTOMATED COUNT: 12.7 % (ref 11.5–14.5)
GLOBULIN SER CALC-MCNC: 3.7 G/DL (ref 2–4)
GLUCOSE SERPL-MCNC: 127 MG/DL (ref 65–100)
HCT VFR BLD AUTO: 46 % (ref 36.6–50.3)
HGB BLD-MCNC: 16 G/DL (ref 12.1–17)
IMM GRANULOCYTES # BLD AUTO: 0 K/UL (ref 0–0.04)
IMM GRANULOCYTES NFR BLD AUTO: 0 % (ref 0–0.5)
LYMPHOCYTES # BLD: 2.1 K/UL (ref 0.8–3.5)
LYMPHOCYTES NFR BLD: 21 % (ref 12–49)
MAGNESIUM SERPL-MCNC: 2 MG/DL (ref 1.6–2.4)
MCH RBC QN AUTO: 30.1 PG (ref 26–34)
MCHC RBC AUTO-ENTMCNC: 34.8 G/DL (ref 30–36.5)
MCV RBC AUTO: 86.6 FL (ref 80–99)
MONOCYTES # BLD: 0.7 K/UL (ref 0–1)
MONOCYTES NFR BLD: 7 % (ref 5–13)
NEUTS SEG # BLD: 7.3 K/UL (ref 1.8–8)
NEUTS SEG NFR BLD: 70 % (ref 32–75)
NRBC # BLD: 0 K/UL (ref 0–0.01)
NRBC BLD-RTO: 0 PER 100 WBC
PLATELET # BLD AUTO: 276 K/UL (ref 150–400)
PMV BLD AUTO: 9.4 FL (ref 8.9–12.9)
POTASSIUM SERPL-SCNC: 5.4 MMOL/L (ref 3.5–5.1)
PROT SERPL-MCNC: 7.6 G/DL (ref 6.4–8.2)
RBC # BLD AUTO: 5.31 M/UL (ref 4.1–5.7)
SODIUM SERPL-SCNC: 140 MMOL/L (ref 136–145)
TROPONIN I SERPL HS-MCNC: 8 NG/L (ref 0–76)
TROPONIN I SERPL HS-MCNC: 9 NG/L (ref 0–76)
WBC # BLD AUTO: 10.3 K/UL (ref 4.1–11.1)

## 2023-11-24 PROCEDURE — 96374 THER/PROPH/DIAG INJ IV PUSH: CPT

## 2023-11-24 PROCEDURE — 2580000003 HC RX 258: Performed by: EMERGENCY MEDICINE

## 2023-11-24 PROCEDURE — 6360000002 HC RX W HCPCS: Performed by: EMERGENCY MEDICINE

## 2023-11-24 PROCEDURE — 36415 COLL VENOUS BLD VENIPUNCTURE: CPT

## 2023-11-24 PROCEDURE — 96375 TX/PRO/DX INJ NEW DRUG ADDON: CPT

## 2023-11-24 PROCEDURE — C9113 INJ PANTOPRAZOLE SODIUM, VIA: HCPCS | Performed by: EMERGENCY MEDICINE

## 2023-11-24 PROCEDURE — 93005 ELECTROCARDIOGRAM TRACING: CPT | Performed by: EMERGENCY MEDICINE

## 2023-11-24 PROCEDURE — 6370000000 HC RX 637 (ALT 250 FOR IP): Performed by: EMERGENCY MEDICINE

## 2023-11-24 PROCEDURE — 83735 ASSAY OF MAGNESIUM: CPT

## 2023-11-24 PROCEDURE — 80053 COMPREHEN METABOLIC PANEL: CPT

## 2023-11-24 PROCEDURE — 99285 EMERGENCY DEPT VISIT HI MDM: CPT

## 2023-11-24 PROCEDURE — 84484 ASSAY OF TROPONIN QUANT: CPT

## 2023-11-24 PROCEDURE — 71045 X-RAY EXAM CHEST 1 VIEW: CPT

## 2023-11-24 PROCEDURE — 85025 COMPLETE CBC W/AUTO DIFF WBC: CPT

## 2023-11-24 PROCEDURE — 94762 N-INVAS EAR/PLS OXIMTRY CONT: CPT

## 2023-11-24 RX ORDER — 0.9 % SODIUM CHLORIDE 0.9 %
1000 INTRAVENOUS SOLUTION INTRAVENOUS ONCE
Status: COMPLETED | OUTPATIENT
Start: 2023-11-24 | End: 2023-11-24

## 2023-11-24 RX ORDER — MECLIZINE HCL 12.5 MG/1
25 TABLET ORAL
Status: COMPLETED | OUTPATIENT
Start: 2023-11-24 | End: 2023-11-24

## 2023-11-24 RX ORDER — ASPIRIN 81 MG/1
81 TABLET, CHEWABLE ORAL ONCE
Status: COMPLETED | OUTPATIENT
Start: 2023-11-24 | End: 2023-11-24

## 2023-11-24 RX ORDER — ONDANSETRON 2 MG/ML
4 INJECTION INTRAMUSCULAR; INTRAVENOUS EVERY 6 HOURS PRN
Status: DISCONTINUED | OUTPATIENT
Start: 2023-11-24 | End: 2023-11-24 | Stop reason: HOSPADM

## 2023-11-24 RX ADMIN — MECLIZINE 25 MG: 12.5 TABLET ORAL at 03:50

## 2023-11-24 RX ADMIN — ASPIRIN 81 MG: 81 TABLET, CHEWABLE ORAL at 04:49

## 2023-11-24 RX ADMIN — SODIUM CHLORIDE 1000 ML: 9 INJECTION, SOLUTION INTRAVENOUS at 03:49

## 2023-11-24 RX ADMIN — ONDANSETRON 4 MG: 2 INJECTION INTRAMUSCULAR; INTRAVENOUS at 03:50

## 2023-11-24 RX ADMIN — ALUMINUM HYDROXIDE AND MAGNESIUM HYDROXIDE 20 ML: 200; 200 SUSPENSION ORAL at 04:49

## 2023-11-24 RX ADMIN — SODIUM CHLORIDE, PRESERVATIVE FREE 40 MG: 5 INJECTION INTRAVENOUS at 04:49

## 2023-11-24 ASSESSMENT — PAIN SCALES - GENERAL: PAINLEVEL_OUTOF10: 0

## 2023-11-24 ASSESSMENT — ENCOUNTER SYMPTOMS
WHEEZING: 0
VOMITING: 0
COUGH: 0
SHORTNESS OF BREATH: 1
DIARRHEA: 0
COLOR CHANGE: 0
NAUSEA: 0
BACK PAIN: 0
ABDOMINAL PAIN: 0

## 2023-11-24 ASSESSMENT — PAIN DESCRIPTION - LOCATION: LOCATION: GENERALIZED

## 2023-11-24 NOTE — ED NOTES
Patient discharged from the ED by Jennie Borrero. Diagnosis, medications, precautions and follow-ups were reviewed with the patient/family. Questions were asked and answered prior to departure. Patient departed the ED via walk-out and was accompanied by family.        Jose Martin Bertrand RN  11/24/23 [id number]

## 2023-11-24 NOTE — ED PROVIDER NOTES
citalopram 10 MG tablet  Commonly known as: CELEXA     fluticasone 50 MCG/ACT nasal spray  Commonly known as: FLONASE     metoprolol succinate 50 MG extended release tablet  Commonly known as: TOPROL XL     montelukast 10 MG tablet  Commonly known as: SINGULAIR     omeprazole 20 MG delayed release capsule  Commonly known as: PRILOSEC     sildenafil 25 MG tablet  Commonly known as: VIAGRA            2.   Mark Bautista MD  [address] [facility]  [phone]    Schedule an appointment as soon as possible for a visit in 3 days      [facility] EMERGENCY DEPT  [address] [place] [address] [address]  [phone]  Go to   As needed, If symptoms worsen    3. Return to ED if worse       I am the Primary Clinician of Record. Darren Carranza MD (electronically signed)    (Please note that parts of this dictation were completed with voice recognition software. Quite often unanticipated grammatical, syntax, homophones, and other interpretive errors are inadvertently transcribed by the computer software. Please disregards these errors.  Please excuse any errors that have escaped final proofreading.)

## 2023-11-24 NOTE — DISCHARGE INSTRUCTIONS
It was a pleasure taking care of you in our Emergency Department today. We know that when you come to [facility], you are entrusting us with your health, comfort, and safety. Our physicians and nurses honor that trust, and truly appreciate the opportunity to care for you and your loved ones. We also value your feedback. If you receive a survey about your Emergency Department experience today, please fill it out. We care about our patients' feedback, and we listen to what you have to say.   Thank you!       --- Dr. Stacia Kat MD

## 2023-11-25 LAB
EKG ATRIAL RATE: 87 BPM
EKG DIAGNOSIS: NORMAL
EKG P AXIS: 32 DEGREES
EKG P-R INTERVAL: 136 MS
EKG Q-T INTERVAL: 406 MS
EKG QRS DURATION: 94 MS
EKG QTC CALCULATION (BAZETT): 488 MS
EKG R AXIS: -33 DEGREES
EKG T AXIS: 30 DEGREES
EKG VENTRICULAR RATE: 87 BPM

## 2024-04-09 NOTE — Clinical Note
A venogram was performed on the Other (document in comment). Injected with single hand injection. Injection volume  = 5 mL. 1 Principal Discharge DX:	Cellulitis of leg, left

## 2024-12-19 NOTE — Clinical Note
Problem: At Risk for Falls  Goal: Patient does not fall  Outcome: Monitoring/Evaluating progress  Goal: Patient takes action to control fall-related risks  Outcome: Monitoring/Evaluating progress     Problem: At Risk for Injury Due to Fall  Goal: Patient does not fall  Outcome: Monitoring/Evaluating progress  Goal: Takes action to control condition specific risks  Outcome: Monitoring/Evaluating progress  Goal: Verbalizes understanding of fall-related injury personal risks  Description: Document education using the patient education activity  Outcome: Monitoring/Evaluating progress      A venogram was performed on the Other (document in comment). Injected with single hand injection. Injection volume  = 5 mL.

## (undated) DEVICE — KIT ELECTRICAL UMBILICAL --

## (undated) DEVICE — VALVE INSRT TOOL ADPT MTL 9FR -- ACCESS

## (undated) DEVICE — PINNACLE INTRODUCER SHEATH: Brand: PINNACLE

## (undated) DEVICE — SHTH STEERABLE FLEXCATH 12 FR --

## (undated) DEVICE — Device: Brand: ACUNAV 10F ULTRASOUND CATHETER

## (undated) DEVICE — KIT COAX UMBILICAL FOR N20 --

## (undated) DEVICE — PROBE ES TEMP HOT AND CLD FAST ACCURATE SFT FLX CIRCA S CATH

## (undated) DEVICE — KIT ELECTRD SURF FOR DISPLAYING THE 3D POS OF EP CATH

## (undated) DEVICE — PRESSURE MONITORING SET: Brand: TRUWAVE

## (undated) DEVICE — CATH QUAD 6F 2/5/2 120CM JSN --

## (undated) DEVICE — DRESSING HEMOSTATIC SFT INTVENT W/O SLT DBL WRP QUIKCLOT LF

## (undated) DEVICE — CABLE EXT EP H/O/D BLK 150CM --

## (undated) DEVICE — HEART CATH-MRMC: Brand: MEDLINE INDUSTRIES, INC.

## (undated) DEVICE — CABLE CATH CONN 10 PIN DISP -- ACHIEVE ADVANCE

## (undated) DEVICE — DRESSING HEMSTAT W12XL12IN 3 PLY QUIKCLOT CONTROL+

## (undated) DEVICE — CABLE RMFG RSPONS ELEC EXT RED --

## (undated) DEVICE — CATH EP CRV 7F DUO 2/8 2M LG -- LIVEWIRE STRL

## (undated) DEVICE — CATHETER CRYOABLATION 28 MM ARCTIC FRONT ADV

## (undated) DEVICE — CATH EP ACHV MPPNG 3.3FR 20MM -- ACHIEVE

## (undated) DEVICE — TUBING PRSS MON L6IN PVC M FEM CONN

## (undated) DEVICE — CABLE RMFG SUPREME BPTPLR/QPLR --

## (undated) DEVICE — 1 X VERSACROSS TRANSSEPTAL SHEATH (INCLUDING  1 X J-TIP GUIDEWIRE); 1 X VERSACROSS RF WIRE (INCLUDING 1 X CONNECTOR CABLE (SINGLE USE)); 1 X DISPERSIVE ELECTRODE: Brand: VERSACROSS ACCESS SOLUTION